# Patient Record
Sex: FEMALE | Race: WHITE | NOT HISPANIC OR LATINO | Employment: FULL TIME | URBAN - METROPOLITAN AREA
[De-identification: names, ages, dates, MRNs, and addresses within clinical notes are randomized per-mention and may not be internally consistent; named-entity substitution may affect disease eponyms.]

---

## 2023-11-13 ENCOUNTER — ANCILLARY PROCEDURE (OUTPATIENT)
Dept: RADIOLOGY | Facility: CLINIC | Age: 58
End: 2023-11-13
Payer: COMMERCIAL

## 2023-11-13 DIAGNOSIS — Z13.820 ENCOUNTER FOR SCREENING FOR OSTEOPOROSIS: ICD-10-CM

## 2023-11-13 PROCEDURE — 77080 DXA BONE DENSITY AXIAL: CPT | Performed by: STUDENT IN AN ORGANIZED HEALTH CARE EDUCATION/TRAINING PROGRAM

## 2023-11-13 PROCEDURE — 77080 DXA BONE DENSITY AXIAL: CPT

## 2023-12-04 ENCOUNTER — ANCILLARY PROCEDURE (OUTPATIENT)
Dept: RADIOLOGY | Facility: CLINIC | Age: 58
End: 2023-12-04
Payer: COMMERCIAL

## 2023-12-04 DIAGNOSIS — Z12.31 ENCOUNTER FOR SCREENING MAMMOGRAM FOR MALIGNANT NEOPLASM OF BREAST: ICD-10-CM

## 2023-12-04 PROCEDURE — 77067 SCR MAMMO BI INCL CAD: CPT | Mod: BILATERAL PROCEDURE | Performed by: RADIOLOGY

## 2023-12-04 PROCEDURE — 77063 BREAST TOMOSYNTHESIS BI: CPT | Mod: BILATERAL PROCEDURE | Performed by: RADIOLOGY

## 2023-12-04 PROCEDURE — 77067 SCR MAMMO BI INCL CAD: CPT

## 2023-12-14 DIAGNOSIS — E55.9 VITAMIN D DEFICIENCY: Primary | ICD-10-CM

## 2023-12-14 RX ORDER — ESTRADIOL 0.1 MG/G
1 CREAM VAGINAL 3 TIMES WEEKLY
COMMUNITY
Start: 2019-04-11 | End: 2024-01-25 | Stop reason: SDUPTHER

## 2023-12-14 RX ORDER — ERGOCALCIFEROL 1.25 MG/1
50000 CAPSULE ORAL
COMMUNITY
End: 2023-12-14 | Stop reason: SDUPTHER

## 2023-12-14 RX ORDER — ERGOCALCIFEROL 1.25 MG/1
50000 CAPSULE ORAL
Qty: 12 CAPSULE | Refills: 3 | Status: SHIPPED | OUTPATIENT
Start: 2023-12-14 | End: 2024-12-13

## 2024-01-21 DIAGNOSIS — Z00.00 HEALTHCARE MAINTENANCE: Primary | ICD-10-CM

## 2024-01-21 DIAGNOSIS — D47.2 MONOCLONAL GAMMOPATHY OF UNDETERMINED SIGNIFICANCE: ICD-10-CM

## 2024-01-21 PROBLEM — U07.1 COVID-19 VIRUS INFECTION: Status: ACTIVE | Noted: 2024-01-21

## 2024-01-21 PROBLEM — B37.9 YEAST INFECTION: Status: ACTIVE | Noted: 2024-01-21

## 2024-01-21 PROBLEM — N63.0 FIBROUS BREAST LUMPS: Status: ACTIVE | Noted: 2024-01-21

## 2024-01-21 PROBLEM — R33.9 URINE RETENTION: Status: ACTIVE | Noted: 2024-01-21

## 2024-01-21 PROBLEM — R79.89 LOW VITAMIN D LEVEL: Status: ACTIVE | Noted: 2024-01-21

## 2024-01-21 PROBLEM — M19.032 PRIMARY OSTEOARTHRITIS OF LEFT WRIST: Status: ACTIVE | Noted: 2024-01-21

## 2024-01-21 PROBLEM — M72.2 PLANTAR FASCIITIS: Status: ACTIVE | Noted: 2018-08-22

## 2024-01-21 PROBLEM — N36.2 URETHRAL POLYP: Status: ACTIVE | Noted: 2024-01-21

## 2024-01-21 PROBLEM — E04.1 NONTOXIC THYROID NODULE: Status: ACTIVE | Noted: 2024-01-21

## 2024-01-21 PROBLEM — K66.0 ABDOMINAL ADHESIONS: Status: ACTIVE | Noted: 2024-01-21

## 2024-01-21 PROBLEM — B00.1 RECURRENT COLD SORES: Status: ACTIVE | Noted: 2024-01-21

## 2024-01-21 PROBLEM — E78.00 ELEVATED LDL CHOLESTEROL LEVEL: Status: ACTIVE | Noted: 2024-01-21

## 2024-01-21 PROBLEM — M19.031 PRIMARY OSTEOARTHRITIS OF RIGHT WRIST: Status: ACTIVE | Noted: 2024-01-21

## 2024-01-21 PROBLEM — M51.36 DEGENERATION OF LUMBAR INTERVERTEBRAL DISC: Status: ACTIVE | Noted: 2024-01-21

## 2024-01-21 PROBLEM — N39.0 RECURRENT UTI: Status: ACTIVE | Noted: 2024-01-21

## 2024-01-21 PROBLEM — E07.89 THYROID FULLNESS: Status: ACTIVE | Noted: 2024-01-21

## 2024-01-21 PROBLEM — M51.369 DEGENERATION OF LUMBAR INTERVERTEBRAL DISC: Status: ACTIVE | Noted: 2024-01-21

## 2024-01-21 PROBLEM — N39.46 URGE AND STRESS INCONTINENCE: Status: ACTIVE | Noted: 2024-01-21

## 2024-01-21 PROBLEM — M19.90 ARTHRITIS: Status: ACTIVE | Noted: 2024-01-21

## 2024-01-21 RX ORDER — FERROUS SULFATE 137(45) MG
1 TABLET, EXTENDED RELEASE ORAL DAILY
COMMUNITY
End: 2024-01-25 | Stop reason: ALTCHOICE

## 2024-01-21 RX ORDER — SOD SULF/POT CHLORIDE/MAG SULF 1.479 G
1 TABLET ORAL DAILY
COMMUNITY
Start: 2023-07-26 | End: 2024-01-25 | Stop reason: ALTCHOICE

## 2024-01-21 RX ORDER — TADALAFIL 5 MG/1
5 TABLET ORAL DAILY
COMMUNITY
Start: 2021-11-30 | End: 2024-01-25 | Stop reason: ENTERED-IN-ERROR

## 2024-01-25 ENCOUNTER — LAB (OUTPATIENT)
Dept: LAB | Facility: LAB | Age: 59
End: 2024-01-25
Payer: COMMERCIAL

## 2024-01-25 ENCOUNTER — OFFICE VISIT (OUTPATIENT)
Dept: PRIMARY CARE | Facility: CLINIC | Age: 59
End: 2024-01-25
Payer: COMMERCIAL

## 2024-01-25 VITALS
OXYGEN SATURATION: 98 % | SYSTOLIC BLOOD PRESSURE: 110 MMHG | HEIGHT: 67 IN | DIASTOLIC BLOOD PRESSURE: 60 MMHG | BODY MASS INDEX: 21.82 KG/M2 | HEART RATE: 61 BPM | TEMPERATURE: 97.4 F | WEIGHT: 139 LBS

## 2024-01-25 DIAGNOSIS — N39.46 URGE AND STRESS INCONTINENCE: ICD-10-CM

## 2024-01-25 DIAGNOSIS — D47.2 MONOCLONAL GAMMOPATHY OF UNDETERMINED SIGNIFICANCE: ICD-10-CM

## 2024-01-25 DIAGNOSIS — B00.1 RECURRENT COLD SORES: ICD-10-CM

## 2024-01-25 DIAGNOSIS — Z00.00 HEALTHCARE MAINTENANCE: ICD-10-CM

## 2024-01-25 DIAGNOSIS — E04.1 NONTOXIC THYROID NODULE: ICD-10-CM

## 2024-01-25 DIAGNOSIS — N39.0 RECURRENT UTI: ICD-10-CM

## 2024-01-25 DIAGNOSIS — Z00.00 HEALTHCARE MAINTENANCE: Primary | ICD-10-CM

## 2024-01-25 DIAGNOSIS — M51.36 DEGENERATION OF LUMBAR INTERVERTEBRAL DISC: ICD-10-CM

## 2024-01-25 DIAGNOSIS — R79.89 LOW VITAMIN D LEVEL: ICD-10-CM

## 2024-01-25 DIAGNOSIS — E78.00 ELEVATED LDL CHOLESTEROL LEVEL: ICD-10-CM

## 2024-01-25 DIAGNOSIS — N63.0 FIBROUS BREAST LUMPS: ICD-10-CM

## 2024-01-25 PROBLEM — U07.1 COVID-19 VIRUS INFECTION: Status: RESOLVED | Noted: 2024-01-21 | Resolved: 2024-01-25

## 2024-01-25 PROBLEM — E07.89 THYROID FULLNESS: Status: RESOLVED | Noted: 2024-01-21 | Resolved: 2024-01-25

## 2024-01-25 PROBLEM — B37.9 YEAST INFECTION: Status: RESOLVED | Noted: 2024-01-21 | Resolved: 2024-01-25

## 2024-01-25 LAB
ALBUMIN SERPL BCP-MCNC: 4.6 G/DL (ref 3.4–5)
ALP SERPL-CCNC: 109 U/L (ref 33–136)
ALT SERPL W P-5'-P-CCNC: 19 U/L (ref 7–45)
ANION GAP SERPL CALC-SCNC: 12 MMOL/L (ref 10–20)
AST SERPL W P-5'-P-CCNC: 21 U/L (ref 9–39)
BASOPHILS # BLD AUTO: 0.04 X10*3/UL (ref 0–0.1)
BASOPHILS NFR BLD AUTO: 0.7 %
BILIRUB SERPL-MCNC: 0.9 MG/DL (ref 0–1.2)
BUN SERPL-MCNC: 15 MG/DL (ref 6–23)
CALCIUM SERPL-MCNC: 9.2 MG/DL (ref 8.6–10.3)
CHLORIDE SERPL-SCNC: 103 MMOL/L (ref 98–107)
CO2 SERPL-SCNC: 28 MMOL/L (ref 21–32)
CREAT SERPL-MCNC: 0.64 MG/DL (ref 0.5–1.05)
CRP SERPL-MCNC: <0.1 MG/DL
EGFRCR SERPLBLD CKD-EPI 2021: >90 ML/MIN/1.73M*2
EOSINOPHIL # BLD AUTO: 0.07 X10*3/UL (ref 0–0.7)
EOSINOPHIL NFR BLD AUTO: 1.3 %
ERYTHROCYTE [DISTWIDTH] IN BLOOD BY AUTOMATED COUNT: 12.8 % (ref 11.5–14.5)
GLUCOSE SERPL-MCNC: 91 MG/DL (ref 74–99)
HCT VFR BLD AUTO: 47 % (ref 36–46)
HGB BLD-MCNC: 15.9 G/DL (ref 12–16)
IMM GRANULOCYTES # BLD AUTO: 0.02 X10*3/UL (ref 0–0.7)
IMM GRANULOCYTES NFR BLD AUTO: 0.4 % (ref 0–0.9)
LDH SERPL L TO P-CCNC: 155 U/L (ref 84–246)
LYMPHOCYTES # BLD AUTO: 1.49 X10*3/UL (ref 1.2–4.8)
LYMPHOCYTES NFR BLD AUTO: 27.4 %
MCH RBC QN AUTO: 30 PG (ref 26–34)
MCHC RBC AUTO-ENTMCNC: 33.8 G/DL (ref 32–36)
MCV RBC AUTO: 89 FL (ref 80–100)
MONOCYTES # BLD AUTO: 0.36 X10*3/UL (ref 0.1–1)
MONOCYTES NFR BLD AUTO: 6.6 %
NEUTROPHILS # BLD AUTO: 3.46 X10*3/UL (ref 1.2–7.7)
NEUTROPHILS NFR BLD AUTO: 63.6 %
NRBC BLD-RTO: 0 /100 WBCS (ref 0–0)
PLATELET # BLD AUTO: 229 X10*3/UL (ref 150–450)
POTASSIUM SERPL-SCNC: 4 MMOL/L (ref 3.5–5.3)
PROT SERPL-MCNC: 7.5 G/DL (ref 6.4–8.2)
PROT SERPL-MCNC: 7.7 G/DL (ref 6.4–8.2)
RBC # BLD AUTO: 5.3 X10*6/UL (ref 4–5.2)
SODIUM SERPL-SCNC: 139 MMOL/L (ref 136–145)
TSH SERPL-ACNC: 1.17 MIU/L (ref 0.44–3.98)
WBC # BLD AUTO: 5.4 X10*3/UL (ref 4.4–11.3)

## 2024-01-25 PROCEDURE — 80053 COMPREHEN METABOLIC PANEL: CPT

## 2024-01-25 PROCEDURE — 36415 COLL VENOUS BLD VENIPUNCTURE: CPT

## 2024-01-25 PROCEDURE — 84155 ASSAY OF PROTEIN SERUM: CPT

## 2024-01-25 PROCEDURE — 83521 IG LIGHT CHAINS FREE EACH: CPT

## 2024-01-25 PROCEDURE — 83615 LACTATE (LD) (LDH) ENZYME: CPT

## 2024-01-25 PROCEDURE — 84165 PROTEIN E-PHORESIS SERUM: CPT | Performed by: INTERNAL MEDICINE

## 2024-01-25 PROCEDURE — 86140 C-REACTIVE PROTEIN: CPT

## 2024-01-25 PROCEDURE — 84443 ASSAY THYROID STIM HORMONE: CPT

## 2024-01-25 PROCEDURE — 99396 PREV VISIT EST AGE 40-64: CPT | Performed by: INTERNAL MEDICINE

## 2024-01-25 PROCEDURE — 84165 PROTEIN E-PHORESIS SERUM: CPT

## 2024-01-25 PROCEDURE — 85025 COMPLETE CBC W/AUTO DIFF WBC: CPT

## 2024-01-25 PROCEDURE — 1036F TOBACCO NON-USER: CPT | Performed by: INTERNAL MEDICINE

## 2024-01-25 RX ORDER — ESTRADIOL 0.1 MG/G
1 CREAM VAGINAL 3 TIMES WEEKLY
Qty: 42.5 G | Refills: 2 | Status: SHIPPED | OUTPATIENT
Start: 2024-01-26

## 2024-01-25 ASSESSMENT — PATIENT HEALTH QUESTIONNAIRE - PHQ9
1. LITTLE INTEREST OR PLEASURE IN DOING THINGS: NOT AT ALL
SUM OF ALL RESPONSES TO PHQ9 QUESTIONS 1 AND 2: 0
2. FEELING DOWN, DEPRESSED OR HOPELESS: NOT AT ALL

## 2024-01-25 NOTE — PROGRESS NOTES
Subjective   Patient ID: Anabella Suarez is a 58 y.o. female who presents for her annual physical     She is compliant with scripted Vitamin D and estrogen vaginal cream  She is taking other supplements as well.    She is bothered by the spider veins in her legs     She is an easy bruiser     HEALTH  PAP  EUGENIA and no longer needs to repeat   Mammo 9/15, , 10/17, , 2021, 2022, 2023   BD 10/13 T-2.1,  T-2.1, 2021 T-2.2, 2023 T-2.3  Colon 2/15, 2023 sessile serrated adenoma and Q 5    -- Her brother  from colon cancer    Ca cardiac score  Zero   EKG , 10/17,   Urine  -,  -   Hep C 3/19 -  FLu shot declines    TDAP , 10/14/2022  Prevnar never   Pneumo never   Shingrix recommended - she does not think she had chicken pox as a child   Pfizer CVD 3/24/2021 and 2021 booster 2022  Ophth - She wears reading glasses. No history of glaucoma or MD.        Review of Systems  All systems negative except those listed in the HPI  Past Medical, Surgical, and Family History reviewed and updated in chart.  Reviewed all medications by prescribing practitioner or clinical pharmacist   (such as prescriptions, OTCs, herbal therapies and supplements) and documented in the medical record      Objective   Visit Vitals  /60 (BP Location: Left arm, Patient Position: Sitting)   Pulse 61   Temp 36.3 °C (97.4 °F) (Temporal)    Body mass index is 22.1 kg/m².     Physical Exam  Vitals reviewed.   Constitutional:       Appearance: Normal appearance. She is normal weight.   HENT:      Head: Normocephalic.      Right Ear: Tympanic membrane, ear canal and external ear normal.      Left Ear: Tympanic membrane, ear canal and external ear normal.      Ears:      Comments: Fluid mild right ear      Nose: Nose normal.      Mouth/Throat:      Pharynx: Oropharynx is clear.   Eyes:      Conjunctiva/sclera: Conjunctivae normal.   Cardiovascular:      Rate and Rhythm: Normal rate and regular  rhythm.      Pulses: Normal pulses.      Heart sounds: Normal heart sounds.      Comments: Bilat LE spider veins/ varicose veins  Pulmonary:      Effort: Pulmonary effort is normal.      Breath sounds: Normal breath sounds.      Comments: No wheezing, moving air well  Abdominal:      General: Bowel sounds are normal.      Palpations: Abdomen is soft.   Musculoskeletal:         General: Normal range of motion.      Cervical back: Normal range of motion and neck supple.   Skin:     General: Skin is warm.   Neurological:      General: No focal deficit present.      Mental Status: She is alert and oriented to person, place, and time.   Psychiatric:         Mood and Affect: Mood normal.         Behavior: Behavior normal.         Thought Content: Thought content normal.         Judgment: Judgment normal.       Assessment/Plan   Problem List Items Addressed This Visit       Degeneration of lumbar intervertebral disc    Elevated LDL cholesterol level    Fibrous breast lumps    Low vitamin D level    Monoclonal gammopathy of undetermined significance    Nontoxic thyroid nodule    Recurrent cold sores    Urge and stress incontinence     Other Visit Diagnoses       Healthcare maintenance    -  Primary            Annual physical completed  Annual labs ordered   Medication reconciliation performed with patient   Reviewed her labs from work:   HDL 92,    Vitamin D 52   HbA1c 5.3%, glucose 83      Health Maintenence completed  -  Discussed healthy diet and regular exercise.    -  Physical exam overall unremarkable. Immunizations reviewed and updated accordingly. Healthy lifestyle choices discussed (tobacco avoidance, appropriate alcohol use, avoidance of illicit substances).   -  Patient is wearing seatbelt.   -  Screening lab work ordered as indicated.    -  Age appropriate screening tests reviewed with patient.       She is  with 1 son. She denies previous history of tobacco use   Son was Dx with testicular cancer  and is in remission   Her son is , lives in [place] and has a baby girl     Her weight/ BMI is in normal range in office, recommend she maintain  Her weight is 139 pounds with BMI at 22.10 IO 1/2024      BP in normal range in office. We will continue to monitor   EKG 10/17 normal, no LVH or strain pattern noted   Recommend she monitor her BP periodically and call with elevated readings      I have spent 15 min face to face with this patient discussing their cardiac risk and behavioral therapies of nutrition choices and exercise. We are trying to eliminate habits that are contributing to their cardiac risk.  We agreed on a plan of how they can reduce their current CV risk   The patient's  10 yr CV risk was estimated at  2 % 1/2024      Elevated lipids: HDL 92,  on labs from work   Explained goal for LDL to be less than 100 and ideally less than 70   Ca cardiac score 11/17 Zero   Recommend she look into a plant based/ whole foods diet      Monitoring for thyroid cancer : Labs ordered and we will adjust if indicated  1/2024    She was in [place] during the nuclear accident and was exposed to radiation   She did not receive Iodine tablets to protect her thyroid   US of thyroid 11/2021 was normal      Monoclonal gammopathy - Labs ordered, we will adjust if indicated 1/2024  She declines to see heme at  anymore.   WBC - Low but baseline for patient   Her platelets are fine and her protein levels are good.      TMJ left dysfunction - Better.   She follows with her dentist routinely      Adhesional blockage left ureter:  She had hydronephrosis and required a stent and nephrostomy placement 7/31/13  She is not taking any medications  She saw Dr Shetty in 10/19     Disc disease back -   She saw Dr. Campbell but is not a surgical candidate yet.  She will stay on Manuka honey which has helped a lot and no inhaler.   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      Vit D Def - Vitamin D 52 on labs from work   Continue scripted Vitamin D 50K UT twice weekly.   Nails brittle and takes Biotin daily      PAP not needed since EUGENIA /BSO. She should still see Gyn every 5 yrs.   Continue estradiol cream 3 times a week. Refilled 2024     Mammo 2023 was normal.   Breast exam normal 2024    Reviewed results of BD from 2023 with patient in detail today 2024  BD 2023 T-2.3. Continue OTC Ca 600 mg BID and scripted Vit D twice weekly and eat 2 servings of calcium enriched foods daily.   Discussed importance of weight bearing exercises       Colonoscopy 2023 sessile serrated adenoma and Q 5    Her brother  from colon cancer      Ophth -   She wears reading glasses. No history of glaucoma or MD.  She will have her next eye exam faxed to my office in order to update her medical records.         Hep C 3/19 -  FLu shot declines    TDAP , 10/14/2022  Prevnar never   Pneumo never   Shingrix recommended - she does not think she had chicken pox as a child   Pfizer CVD 3/24/2021 and 2021 booster 2022    Some elements in the chart were copied from Dr. Penaloza's last office visit with patient.   Notes have been updated where appropriate, and reflect my current medical decision making from today.        Return 1 year for CPE or sooner if needed   (CPE due 2024)     Scribe Attestation  By signing my name below, I, Eileen Evert , Scribe   attest that this documentation has been prepared under the direction and in the presence of Yuliet Penaloza MD.

## 2024-01-26 LAB
KAPPA LC SERPL-MCNC: 1.72 MG/DL (ref 0.33–1.94)
KAPPA LC/LAMBDA SER: 1.89 {RATIO} (ref 0.26–1.65)
LAMBDA LC SERPL-MCNC: 0.91 MG/DL (ref 0.57–2.63)

## 2024-01-29 LAB
ALBUMIN: 4.6 G/DL (ref 3.4–5)
ALPHA 1 GLOBULIN: 0.3 G/DL (ref 0.2–0.6)
ALPHA 2 GLOBULIN: 0.6 G/DL (ref 0.4–1.1)
BETA GLOBULIN: 0.7 G/DL (ref 0.5–1.2)
GAMMA GLOBULIN: 1.3 G/DL (ref 0.5–1.4)
M-PROTEIN 1: 0.4 G/DL
PATH REVIEW-SERUM PROTEIN ELECTROPHORESIS: ABNORMAL
PROTEIN ELECTROPHORESIS COMMENT: ABNORMAL

## 2024-04-29 ENCOUNTER — TELEPHONE (OUTPATIENT)
Dept: PRIMARY CARE | Facility: CLINIC | Age: 59
End: 2024-04-29
Payer: COMMERCIAL

## 2024-04-29 ENCOUNTER — OFFICE VISIT (OUTPATIENT)
Dept: PRIMARY CARE | Facility: CLINIC | Age: 59
End: 2024-04-29
Payer: COMMERCIAL

## 2024-04-29 VITALS
HEIGHT: 67 IN | DIASTOLIC BLOOD PRESSURE: 60 MMHG | SYSTOLIC BLOOD PRESSURE: 118 MMHG | TEMPERATURE: 97.6 F | HEART RATE: 62 BPM | WEIGHT: 137 LBS | BODY MASS INDEX: 21.5 KG/M2 | OXYGEN SATURATION: 98 %

## 2024-04-29 DIAGNOSIS — B49 FUNGAL INFECTION: Primary | ICD-10-CM

## 2024-04-29 DIAGNOSIS — B35.1 FUNGAL NAIL INFECTION: ICD-10-CM

## 2024-04-29 PROCEDURE — 99214 OFFICE O/P EST MOD 30 MIN: CPT | Performed by: INTERNAL MEDICINE

## 2024-04-29 PROCEDURE — 1036F TOBACCO NON-USER: CPT | Performed by: INTERNAL MEDICINE

## 2024-04-29 RX ORDER — KETOCONAZOLE 20 MG/G
CREAM TOPICAL
Qty: 60 G | Refills: 3 | Status: SHIPPED | OUTPATIENT
Start: 2024-04-29

## 2024-04-29 NOTE — PROGRESS NOTES
Subjective   Patient ID: Anabella Suarez is a 58 y.o. female who presents for evaluation for umbilicus irritation and edema       She complains of edema and irritation to the umbilicus area.  She had laparoscopic procedure in the past.   She has no pain but it is mildly itchy.  She has been keeping the area clean and using EtOH on the area  She has used Abx cream on the area also         HEALTH  PAP  EUGENIA and no longer needs to repeat   Mammo 9/15, , 10/17, , , ,    BD 10/13 T-2.1,  T-2.1,  T-2.2,  T-2.3  Colon 2/15,  sessile serrated adenoma and Q 5    -- Her brother  from colon cancer    Ca cardiac score  Zero   EKG , 10/17,   Urine  -,  -   Hep C 3/19 -  FLu shot declines    TDAP , 10/14/2022  Prevnar never   Pneumo never   Shingrix recommended - she does not think she had chicken pox as a child   Pfizer CVD 3/24/2021 and 2021 booster 2022  Ophth - She wears reading glasses. No history of glaucoma or MD.      Review of Systems  All systems negative except those listed in the HPI      Objective   Visit Vitals  /60 (BP Location: Left arm, Patient Position: Sitting)   Pulse 62   Temp 36.4 °C (97.6 °F) (Temporal)    Body mass index is 21.78 kg/m².     Physical Exam  Vitals reviewed.   Constitutional:       Appearance: Normal appearance. She is normal weight.   HENT:      Head: Normocephalic.      Right Ear: Tympanic membrane, ear canal and external ear normal.      Left Ear: Tympanic membrane, ear canal and external ear normal.      Nose: Nose normal.      Mouth/Throat:      Pharynx: Oropharynx is clear.   Eyes:      Conjunctiva/sclera: Conjunctivae normal.   Cardiovascular:      Rate and Rhythm: Normal rate and regular rhythm.      Pulses: Normal pulses.      Heart sounds: Normal heart sounds.   Pulmonary:      Effort: Pulmonary effort is normal.      Breath sounds: Normal breath sounds.   Abdominal:      General: Bowel sounds are  normal.      Palpations: Abdomen is soft.   Musculoskeletal:         General: Normal range of motion.      Cervical back: Normal range of motion and neck supple.      Comments: Bilateral great toenail fungus    Skin:     General: Skin is warm.      Comments: scar tissue from laparoscopy, no open hole noted, no cellulitis to the umbilicus, erythema noted    Neurological:      General: No focal deficit present.      Mental Status: She is alert and oriented to person, place, and time.   Psychiatric:         Mood and Affect: Mood normal.         Behavior: Behavior normal.         Thought Content: Thought content normal.         Judgment: Judgment normal.     Assessment/Plan   Problem List Items Addressed This Visit    None  Visit Diagnoses       Fungal infection    -  Primary    Relevant Medications    ketoconazole (NIZOral) 2 % cream    Fungal nail infection        Relevant Medications    ketoconazole (NIZOral) 2 % cream                Follow up completed  Reviewed her labs from 1/2024     She is  with 1 son. She denies previous history of tobacco use   Son was Dx with testicular cancer and is in remission   Her son is , lives in [place] and has a baby girl     Acute umbilical fungal infection : On exam: scar tissue from laparoscopy, no open hole noted, no cellulitis to the umbilicus, erythema noted 4/24. Explained I think this is from rubbing versus a suture trying to come out.   She complains of edema and irritation to the umbilicus area.  She had laparoscopic procedure in the past.   She has no pain but it is mildly itchy.  She has been keeping the area clean and using EtOH on the area  She has used Abx cream on the area also    Prescription sent in for ketoconazole cream to apply as directed BID. Explained how to clean the area and then apply the cream once fully dried.   Keep umbilicus area clean and dry   She will call if Sx persist or worsen     Bilateral great toe nail fungus:  Prescription sent in  for ketoconazole cream to apply as directed BID. Explained how to clean the area and then apply the cream under the toenail.    Keep trimming the toenail to help remove the fungus. Explained how to trim the nails  Recommend airing the toes out now that it is spring.      Her weight/ BMI is in normal range in office, recommend she maintain  Her weight is 137 pounds with BMI at 21.78 IO 4/2024      BP in normal range in office. We will continue to monitor   EKG 10/17 normal, no LVH or strain pattern noted   Recommend she monitor her BP periodically and call with elevated readings      We discussed the patients cardiovascular risk. If needed, lifestyle modifications recommended including: behavioral therapies of nutrition choices, exercise and eliminate habits that are contributing to their cardiac risk. We agreed to a plan to decrease his cardiovascular risks. Discussed ASA. Reviewed Guidelines and approved recommendations made to patient.   The patient's 10 yr CV risk was estimated at  2% 4/2024      Elevated lipids: HDL 92,  on labs from work   Explained goal for LDL to be less than 100 and ideally less than 70   Ca cardiac score 11/17 Zero   Recommend she look into a plant based/ whole foods diet      Monitoring for thyroid cancer :   She was in [place] during the nuclear accident and was exposed to radiation   She did not receive Iodine tablets to protect her thyroid   US of thyroid 11/2021 was normal      Monoclonal gammopathy - Labs ordered, we will adjust if indicated 1/2024  She declines to see heme at  anymore.   WBC - Low but baseline for patient   Her platelets are fine and her protein levels are good.      TMJ left dysfunction - Better.   She follows with her dentist routinely      Adhesional blockage left ureter:  She had hydronephrosis and required a stent and nephrostomy placement 7/31/13  She is not taking any medications  She saw Dr Shetty in 10/19     Disc disease back -   She saw Dr. Campbell  but is not a surgical candidate yet.  She will stay on Manuka honey which has helped a lot and no inhaler.   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      Vit D Def - Vitamin D 52 on labs from work   Continue scripted Vitamin D 50K UT twice weekly.   Nails brittle and takes Biotin daily      PAP not needed since EUGNEIA /BSO. She should still see Gyn every 5 yrs.   Continue estradiol cream 3 times a week. Refilled 2024     Mammo 2023 was normal.   Breast exam normal 2024     Reviewed results of BD from 2023 with patient in detail today 2024  BD 2023 T-2.3. Continue OTC Ca 600 mg BID and scripted Vit D twice weekly and eat 2 servings of calcium enriched foods daily.   Discussed importance of weight bearing exercises       Colonoscopy 2023 sessile serrated adenoma and Q 5    Her brother  from colon cancer      Ophth -   She wears reading glasses. No history of glaucoma or MD.  She will have her next eye exam faxed to my office in order to update her medical records.         Hep C 3/19 -  FLu shot declines    TDAP , 10/14/2022  Prevnar never   Pneumo never   Shingrix recommended - she does not think she had chicken pox as a child   Pfizer CVD 3/24/2021 and 2021 booster 2022     Some elements in the chart were copied from Dr. Penaloza's last office visit with patient.   Notes have been updated where appropriate, and reflect my current medical decision making from today.        Return 1 year for CPE or sooner if needed   (CPE due 2024)      Scribe Attestation  By signing my name below, IEileen , Sharda    attest that this documentation has been prepared under the direction and in the presence of Yuliet Penaloza MD.

## 2024-04-29 NOTE — TELEPHONE ENCOUNTER
Patient called me 4/28/24 with increasing irritation belly button and wated appointment   Please put her in for 3:45 today to evaluate further

## 2024-05-14 ENCOUNTER — TELEPHONE (OUTPATIENT)
Dept: PRIMARY CARE | Facility: CLINIC | Age: 59
End: 2024-05-14
Payer: COMMERCIAL

## 2024-05-14 NOTE — TELEPHONE ENCOUNTER
Pt reached out to me that she feels belly button is no better but no worse   Recommended derm but wanted to wait until sees me 5/23 since no sooner appointment with her work schedule   Will keep me posted with any changes

## 2024-05-20 ENCOUNTER — APPOINTMENT (OUTPATIENT)
Dept: PRIMARY CARE | Facility: CLINIC | Age: 59
End: 2024-05-20
Payer: COMMERCIAL

## 2024-05-22 NOTE — PROGRESS NOTES
Subjective   Patient ID: Anabella Suarez is a 59 y.o. female who presents for 3 week follow up multiple medical conditions and umbilicus infection that has not improved     She has been using ketoconazole cream on the umbilicus and it has improved some   She is still worried about the umbilical area and wanted to have it checked         HEALTH  PAP  EUGENIA and no longer needs to repeat   Mammo 9/15, , 10/17, , , ,    BD 10/13 T-2.1,  T-2.1,  T-2.2,  T-2.3  Colon 2/15,  serrated adenoma and Q 5    -- Her brother  from colon cancer    Ca cardiac score  Zero   EKG , 10/17,   Urine  -,  -   Hep C 3/19 -  FLu shot declines    TDAP , 10/14/2022  Prevnar never   Pneumo never   Shingrix recommended - she does not think she had chicken pox as a child   Pfizer CVD 3/24/2021 and 2021 booster 2022  Ophth - She wears reading glasses. No history of glaucoma or MD.      Review of Systems  All systems negative except those listed in the HPI       Objective   Visit Vitals  /60 (BP Location: Left arm, Patient Position: Sitting)   Pulse 70   Temp 36 °C (96.8 °F) (Temporal)    Body mass index is 22.1 kg/m².     Physical Exam  Vitals reviewed.   Constitutional:       Appearance: Normal appearance. She is normal weight.   HENT:      Head: Normocephalic.      Right Ear: Tympanic membrane, ear canal and external ear normal.      Left Ear: Tympanic membrane, ear canal and external ear normal.      Nose: Nose normal.      Mouth/Throat:      Pharynx: Oropharynx is clear.   Eyes:      Conjunctiva/sclera: Conjunctivae normal.   Cardiovascular:      Rate and Rhythm: Normal rate and regular rhythm.      Pulses: Normal pulses.      Heart sounds: Normal heart sounds.   Pulmonary:      Effort: Pulmonary effort is normal.      Breath sounds: Normal breath sounds.   Abdominal:      General: Bowel sounds are normal.      Palpations: Abdomen is soft.      Comments: Umbilical  area: scar tissue from laparoscopy, small skin breakdown umbilical area    Musculoskeletal:         General: Normal range of motion.      Cervical back: Normal range of motion and neck supple.   Skin:     General: Skin is warm.   Neurological:      General: No focal deficit present.      Mental Status: She is alert and oriented to person, place, and time.   Psychiatric:         Mood and Affect: Mood normal.         Behavior: Behavior normal.         Thought Content: Thought content normal.         Judgment: Judgment normal.     Assessment/Plan                Follow up completed  Reviewed her labs from 1/24     She is  with 1 son. She denies previous history of tobacco use   Son was Dx with testicular cancer and is in remission   Her son is , lives in [place] and has a baby girl      Acute umbilical fungal infection : On exam: scar tissue from laparoscopy, small skin breakdown umbilical area 5/2024. There is less erythema and the area is healing.   She had laparoscopic procedure in the past.   Stop ketoconazole cream    Prescription sent in for mupirocin cream to apply as directed to the umbilicus area    Keep umbilicus area clean and dry   Recommend she dry the umbilical area very well after showering       Bilateral great toe nail fungus:  Continue ketoconazole cream to apply as directed BID.    Keep trimming the toenail to help remove the fungus. Explained how to trim the nails  Recommend airing the toes out now that it is spring.      Her weight/ BMI is in normal range in office, recommend she maintain  Her weight is 139 pounds with BMI at 22.10 IO 5/2024      BP in normal range in office. We will continue to monitor   EKG 10/17 normal, no LVH or strain pattern noted   Recommend she monitor her BP periodically and call with elevated readings      We spent 15 minutes discussing cardiac risk score.   We discussed the patients cardiovascular risk. If needed, lifestyle modifications recommended  including: behavioral therapies of nutrition choices, exercise and eliminate habits that are contributing to their cardiac risk. We agreed to a plan to decrease his cardiovascular risks. Discussed ASA. Reviewed Guidelines and approved recommendations made to patient.   The patient's 10 yr CV risk was estimated at  2.6 % 5/2024      Elevated lipids: HDL 92,  on labs from work   Explained goal for LDL to be less than 100 and ideally less than 70   Ca cardiac score 11/17 Zero   Recommend she look into a plant based/ whole foods diet      Monitoring for thyroid cancer :   She was in [place] during the nuclear accident and was exposed to radiation   She did not receive Iodine tablets to protect her thyroid   US of thyroid 11/2021 was normal      Monoclonal gammopathy - Jose/ lambda ratio is 1.89 on labs in 1/2024  She declines to see heme at  anymore.   WBC - Low but baseline for patient   Her platelets are fine and her protein levels are good.      TMJ left dysfunction - Better.   She follows with her dentist routinely      Adhesional blockage left ureter:  She had hydronephrosis and required a stent and nephrostomy placement 7/31/13  She is not taking any medications  She saw Dr Shetty in 10/19     Disc disease back -   She saw Dr. Campbell but is not a surgical candidate yet.  She will stay on Manuka honey which has helped a lot and no inhaler.   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      Vit D Def - Vitamin D 52 on labs from work   Continue scripted Vitamin D 50K UT twice weekly.   Nails brittle and takes Biotin daily      PAP  not needed since EUGENIA /BSO. She should still see Gyn every 5 yrs.   Continue estradiol cream 3 times a week. Refilled 2024     Mammo 2023 was normal.   Breast exam normal 2024     Reviewed results of BD from 2023 with patient in detail today 2024  BD 2023 T-2.3. Continue OTC Ca 600 mg BID and scripted Vit D twice weekly and eat 2 servings of calcium enriched foods daily.   Discussed importance of weight bearing exercises       Colonoscopy 2023 sessile serrated adenoma and Q 5    Her brother  from colon cancer      Ophth -   She wears reading glasses. No history of glaucoma or MD.  She will have her next eye exam faxed to my office in order to update her medical records.         Hep C 3/19 -  FLu shot declines    TDAP , 10/14/2022  Prevnar never   Pneumo never   Shingrix recommended - she does not think she had chicken pox as a child   Pfizer CVD 3/24/2021 and 2021 booster 2022      Some elements in the chart were copied from Dr. Penaloza's last office visit with patient.   Notes have been updated where appropriate, and reflect my current medical decision making from today.        Return 1 year for CPE or sooner if needed   (CPE due 2025)      Scribe Attestation  By signing my name below, I Eileen Evert , Scribe   attest that this documentation has been prepared under the direction and in the presence of Yuliet Penaloza MD.

## 2024-05-23 ENCOUNTER — OFFICE VISIT (OUTPATIENT)
Dept: PRIMARY CARE | Facility: CLINIC | Age: 59
End: 2024-05-23
Payer: COMMERCIAL

## 2024-05-23 VITALS
BODY MASS INDEX: 21.82 KG/M2 | SYSTOLIC BLOOD PRESSURE: 108 MMHG | DIASTOLIC BLOOD PRESSURE: 60 MMHG | HEIGHT: 67 IN | TEMPERATURE: 96.8 F | HEART RATE: 70 BPM | WEIGHT: 139 LBS | OXYGEN SATURATION: 98 %

## 2024-05-23 DIAGNOSIS — N39.46 URGE AND STRESS INCONTINENCE: ICD-10-CM

## 2024-05-23 DIAGNOSIS — R21 RASH AND NONSPECIFIC SKIN ERUPTION: Primary | ICD-10-CM

## 2024-05-23 DIAGNOSIS — N36.2 URETHRAL POLYP: ICD-10-CM

## 2024-05-23 DIAGNOSIS — D47.2 MONOCLONAL GAMMOPATHY OF UNDETERMINED SIGNIFICANCE: ICD-10-CM

## 2024-05-23 PROCEDURE — 99214 OFFICE O/P EST MOD 30 MIN: CPT | Performed by: INTERNAL MEDICINE

## 2024-05-23 PROCEDURE — 1036F TOBACCO NON-USER: CPT | Performed by: INTERNAL MEDICINE

## 2024-05-23 RX ORDER — MUPIROCIN 20 MG/G
OINTMENT TOPICAL 3 TIMES DAILY
Qty: 22 G | Refills: 0 | Status: SHIPPED | OUTPATIENT
Start: 2024-05-23 | End: 2024-06-02

## 2024-05-23 ASSESSMENT — PATIENT HEALTH QUESTIONNAIRE - PHQ9
1. LITTLE INTEREST OR PLEASURE IN DOING THINGS: NOT AT ALL
2. FEELING DOWN, DEPRESSED OR HOPELESS: NOT AT ALL
SUM OF ALL RESPONSES TO PHQ9 QUESTIONS 1 AND 2: 0

## 2024-07-28 ENCOUNTER — TELEPHONE (OUTPATIENT)
Dept: PRIMARY CARE | Facility: CLINIC | Age: 59
End: 2024-07-28
Payer: COMMERCIAL

## 2024-07-28 DIAGNOSIS — R30.0 DYSURIA: Primary | ICD-10-CM

## 2024-07-28 RX ORDER — FLUCONAZOLE 150 MG/1
TABLET ORAL
Qty: 2 TABLET | Refills: 1 | Status: SHIPPED | OUTPATIENT
Start: 2024-07-28

## 2024-07-28 RX ORDER — CIPROFLOXACIN 250 MG/1
250 TABLET, FILM COATED ORAL 2 TIMES DAILY
Qty: 14 TABLET | Refills: 0 | Status: SHIPPED | OUTPATIENT
Start: 2024-07-28 | End: 2024-08-04

## 2024-10-28 DIAGNOSIS — N30.00 ACUTE CYSTITIS WITHOUT HEMATURIA: Primary | ICD-10-CM

## 2024-10-28 DIAGNOSIS — B37.31 YEAST VAGINITIS: ICD-10-CM

## 2024-10-28 DIAGNOSIS — R30.0 DYSURIA: ICD-10-CM

## 2024-10-28 RX ORDER — FLUCONAZOLE 150 MG/1
TABLET ORAL
Qty: 2 TABLET | Refills: 1 | Status: SHIPPED | OUTPATIENT
Start: 2024-10-28

## 2024-10-28 RX ORDER — NITROFURANTOIN 25; 75 MG/1; MG/1
100 CAPSULE ORAL 2 TIMES DAILY
Qty: 10 CAPSULE | Refills: 0 | Status: SHIPPED | OUTPATIENT
Start: 2024-10-28 | End: 2024-11-02

## 2024-10-29 ENCOUNTER — TELEPHONE (OUTPATIENT)
Dept: PRIMARY CARE | Facility: CLINIC | Age: 59
End: 2024-10-29
Payer: COMMERCIAL

## 2024-10-29 ENCOUNTER — LAB (OUTPATIENT)
Dept: LAB | Facility: LAB | Age: 59
End: 2024-10-29
Payer: COMMERCIAL

## 2024-10-29 DIAGNOSIS — R30.0 DYSURIA: ICD-10-CM

## 2024-10-29 DIAGNOSIS — R30.0 DYSURIA: Primary | ICD-10-CM

## 2024-10-29 PROCEDURE — 81003 URINALYSIS AUTO W/O SCOPE: CPT

## 2024-10-29 PROCEDURE — 87086 URINE CULTURE/COLONY COUNT: CPT

## 2024-10-30 DIAGNOSIS — R30.0 DYSURIA: Primary | ICD-10-CM

## 2024-10-30 LAB
APPEARANCE UR: CLEAR
BILIRUB UR STRIP.AUTO-MCNC: NEGATIVE MG/DL
COLOR UR: COLORLESS
GLUCOSE UR STRIP.AUTO-MCNC: NORMAL MG/DL
HOLD SPECIMEN: NORMAL
KETONES UR STRIP.AUTO-MCNC: NEGATIVE MG/DL
LEUKOCYTE ESTERASE UR QL STRIP.AUTO: NEGATIVE
NITRITE UR QL STRIP.AUTO: NEGATIVE
PH UR STRIP.AUTO: 7 [PH]
PROT UR STRIP.AUTO-MCNC: NEGATIVE MG/DL
RBC # UR STRIP.AUTO: NEGATIVE /UL
SP GR UR STRIP.AUTO: 1.01
UROBILINOGEN UR STRIP.AUTO-MCNC: NORMAL MG/DL

## 2024-10-31 LAB — BACTERIA UR CULT: NO GROWTH

## 2025-01-25 DIAGNOSIS — D47.2 MONOCLONAL GAMMOPATHY OF UNDETERMINED SIGNIFICANCE: ICD-10-CM

## 2025-01-25 DIAGNOSIS — Z00.00 HEALTHCARE MAINTENANCE: Primary | ICD-10-CM

## 2025-01-25 DIAGNOSIS — R30.0 DYSURIA: ICD-10-CM

## 2025-01-25 DIAGNOSIS — Z12.31 VISIT FOR SCREENING MAMMOGRAM: Primary | ICD-10-CM

## 2025-01-27 ENCOUNTER — APPOINTMENT (OUTPATIENT)
Dept: PRIMARY CARE | Facility: CLINIC | Age: 60
End: 2025-01-27
Payer: COMMERCIAL

## 2025-01-27 VITALS
DIASTOLIC BLOOD PRESSURE: 70 MMHG | BODY MASS INDEX: 21.22 KG/M2 | SYSTOLIC BLOOD PRESSURE: 120 MMHG | OXYGEN SATURATION: 98 % | HEART RATE: 62 BPM | HEIGHT: 68 IN | WEIGHT: 140 LBS

## 2025-01-27 DIAGNOSIS — R71.8 HIGH HEMATOCRIT WITHOUT DEHYDRATION: ICD-10-CM

## 2025-01-27 DIAGNOSIS — N39.46 URGE AND STRESS INCONTINENCE: ICD-10-CM

## 2025-01-27 DIAGNOSIS — Z28.21 FLU VACCINE REFUSED: ICD-10-CM

## 2025-01-27 DIAGNOSIS — E04.1 NONTOXIC THYROID NODULE: ICD-10-CM

## 2025-01-27 DIAGNOSIS — E06.3 HASHIMOTO THYROIDITIS: ICD-10-CM

## 2025-01-27 DIAGNOSIS — Z00.00 HEALTHCARE MAINTENANCE: Primary | ICD-10-CM

## 2025-01-27 DIAGNOSIS — N39.0 RECURRENT UTI: ICD-10-CM

## 2025-01-27 DIAGNOSIS — J34.89 NASAL DRYNESS: ICD-10-CM

## 2025-01-27 DIAGNOSIS — D47.2 MONOCLONAL GAMMOPATHY OF UNDETERMINED SIGNIFICANCE: ICD-10-CM

## 2025-01-27 DIAGNOSIS — R33.9 URINE RETENTION: ICD-10-CM

## 2025-01-27 DIAGNOSIS — B00.1 RECURRENT COLD SORES: ICD-10-CM

## 2025-01-27 DIAGNOSIS — F51.01 PRIMARY INSOMNIA: ICD-10-CM

## 2025-01-27 DIAGNOSIS — M51.360 DEGENERATION OF INTERVERTEBRAL DISC OF LUMBAR REGION WITH DISCOGENIC BACK PAIN: ICD-10-CM

## 2025-01-27 DIAGNOSIS — E78.00 ELEVATED LDL CHOLESTEROL LEVEL: ICD-10-CM

## 2025-01-27 PROCEDURE — 93000 ELECTROCARDIOGRAM COMPLETE: CPT | Performed by: INTERNAL MEDICINE

## 2025-01-27 PROCEDURE — 3008F BODY MASS INDEX DOCD: CPT | Performed by: INTERNAL MEDICINE

## 2025-01-27 PROCEDURE — 99396 PREV VISIT EST AGE 40-64: CPT | Performed by: INTERNAL MEDICINE

## 2025-01-27 PROCEDURE — 1036F TOBACCO NON-USER: CPT | Performed by: INTERNAL MEDICINE

## 2025-01-27 RX ORDER — HYDROXYZINE HYDROCHLORIDE 25 MG/1
25 TABLET, FILM COATED ORAL NIGHTLY
Qty: 90 TABLET | Refills: 0 | Status: SHIPPED | OUTPATIENT
Start: 2025-01-27

## 2025-01-27 RX ORDER — MUPIROCIN 20 MG/G
OINTMENT TOPICAL 3 TIMES DAILY
Qty: 22 G | Refills: 3 | Status: SHIPPED | OUTPATIENT
Start: 2025-01-27 | End: 2025-02-06

## 2025-01-27 NOTE — PROGRESS NOTES
Subjective   Patient ID: Anabella Suarez is a 59 y.o. female presenting for annual physical exam.       HPI    She has evidence of bradycardia on ECG.  She also complaints of difficulty sleeping, she has tried Melatonin without any benefits. She also tried Nyquil which helps her.       HEALTH  PAP  EUGENIA and no longer needs to repeat   Mammo 9/15, , 10/17, , , ,    BD 10/13 T-2.1,  T-2.1,  T-2.2,  T-2.3  Colon 2/15,  serrated adenoma and Q 5    -- Her brother  from colon cancer    Ca cardiac score  Zero   EKG , 10/17,   Urine  -,  -   Hep C 3/19 -  FLu shot declines    TDAP , 10/14/2022  Prevnar never   Pneumo never   Shingrix recommended - she does not think she had chicken pox as a child   Pfizer CVD 3/24/2021 and 2021 booster 2022  Ophth - She wears reading glasses. No history of glaucoma or MD.      Review of Systems  All systems negative except those listed in the HPI     Visit Vitals  /70 (BP Location: Left arm, Patient Position: Sitting, BP Cuff Size: Adult)   Pulse 62       Body mass index is 21.6 kg/m².    Objective   Physical Exam  Constitutional:       Appearance: Normal appearance.   HENT:      Head: Normocephalic.      Right Ear: Tympanic membrane, ear canal and external ear normal.      Left Ear: Tympanic membrane, ear canal and external ear normal.      Nose: Nose normal.   Eyes:      Conjunctiva/sclera: Conjunctivae normal.   Cardiovascular:      Rate and Rhythm: Regular rhythm. Bradycardia present.      Pulses: Normal pulses.      Heart sounds: Normal heart sounds.   Pulmonary:      Effort: Pulmonary effort is normal.   Abdominal:      Palpations: Abdomen is soft.   Musculoskeletal:         General: Tenderness (tenderness to scapular areas) present. Normal range of motion.      Cervical back: Normal range of motion.   Skin:     General: Skin is warm.   Neurological:      General: No focal deficit present.      Mental  Status: She is alert and oriented to person, place, and time.   Psychiatric:         Mood and Affect: Mood normal.         Behavior: Behavior normal.         Thought Content: Thought content normal.         Judgment: Judgment normal.         Assessment/Plan   Diagnoses and all orders for this visit:  Healthcare maintenance  -     ECG 12 lead (Clinic Performed)  Elevated LDL cholesterol level  -     ECG 12 lead (Clinic Performed)  Nontoxic thyroid nodule  Recurrent UTI  Urine retention  Urge and stress incontinence  Monoclonal gammopathy of undetermined significance  Recurrent cold sores  Degeneration of intervertebral disc of lumbar region with discogenic back pain  Flu vaccine refused  Primary insomnia  -     hydrOXYzine HCL (Atarax) 25 mg tablet; Take 1 tablet (25 mg) by mouth once daily at bedtime.  Nasal dryness  -     mupirocin (Bactroban) 2 % ointment; Apply topically 3 times a day for 10 days. apply to affected area  Hashimoto thyroiditis  High hematocrit without dehydration  -     Iron and TIBC; Future    Problem List Items Addressed This Visit       Degeneration of lumbar intervertebral disc    Elevated LDL cholesterol level    Relevant Orders    ECG 12 lead (Clinic Performed)    Monoclonal gammopathy of undetermined significance    RESOLVED: Nontoxic thyroid nodule    Recurrent cold sores    Recurrent UTI    Urge and stress incontinence    Urine retention     Other Visit Diagnoses       Healthcare maintenance    -  Primary    Relevant Orders    ECG 12 lead (Clinic Performed)    Flu vaccine refused        Primary insomnia        Relevant Medications    hydrOXYzine HCL (Atarax) 25 mg tablet    Nasal dryness        Relevant Medications    mupirocin (Bactroban) 2 % ointment    Hashimoto thyroiditis        High hematocrit without dehydration        Relevant Orders    Iron and TIBC          Annual physical completed.  Reviewed her recent labs, patient brought into clinic from 1/25.  Labs ordered.       She is   with 1 son. She denies previous history of tobacco use   Son was Dx with testicular cancer and is in remission   Her son is , lives in [place] and has a baby girl      Bradycardia:  ECG: Sinus bradycardia, Heart rate of 48 bpm. No LVH or strain pattern noted.   Recommend monitoring at home when she is at rest.     Insomnia:   She has tried Melatonin without benefits. She is able to sleep after taking Nyquil.  Recommend OTC sleep aids.  Prescribed Hydroxyzine 25 mg to take before bed time.    Nasal dryness:  Prescribed Bactroban to use PRN.     Bilateral great toe nail fungus:  Continue ketoconazole cream to apply as directed BID.    Keep trimming the toenail to help remove the fungus. Explained how to trim the nails  Recommend airing the toes out now that it is spring.      Her weight/ BMI is in normal range in office, recommend she maintain  Her weight is 140 pounds with BMI at 21.60 IO 1/2025.     BP in normal range in office. We will continue to monitor   EKG 1/2025 sinus bradycardia Sinus bradycardia, 48, no LVH or strain pattern noted   Recommend she monitor her BP periodically and call with elevated readings      We spent 15 minutes discussing cardiac risk score.   We discussed the patients cardiovascular risk. If needed, lifestyle modifications recommended including: behavioral therapies of nutrition choices, exercise and eliminate habits that are contributing to their cardiac risk. We agreed to a plan to decrease his cardiovascular risks. Discussed ASA. Reviewed Guidelines and approved recommendations made to patient.   The patient's 10 yr CV risk was estimated at  2.6 % 5/2024      Elevated lipids: HDL 92, LDL 12 on labs from work   Explained goal for LDL to be less than 100 and ideally less than 70   Ca cardiac score 11/17 Zero   Recommend she look into a plant based/ whole foods diet      Monitoring for thyroid cancer :   She was in [place] during the nuclear accident and was exposed to  radiation   She did not receive Iodine tablets to protect her thyroid   US of thyroid 2021 was normal      Monoclonal gammopathy - Joes/ lambda ratio is 1.89 on labs in 2024  She declines to see heme at  anymore.   WBC - Low but baseline for patient   Her platelets are fine and her protein levels are good.      TMJ left dysfunction - Better.   She follows with her dentist routinely      Adhesional blockage left ureter:  She had hydronephrosis and required a stent and nephrostomy placement 13  She is not taking any medications  She saw Dr Shetty in 10/19     Disc disease back -   She saw Dr. Campbell but is not a surgical candidate yet.  She will stay on Manuka honey which has helped a lot and no inhaler.   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      Vit D Def - Vitamin D 52 on labs from work   Continue scripted Vitamin D 50K UT twice weekly.   Nails brittle and takes Biotin daily      PAP not needed since EUGENIA /BSO. She should still see Gyn every 5 yrs.   Continue estradiol cream 3 times a week. Refilled 2024     Mammo 2023 was normal. Ordered for .  Breast exam normal 2024     Reviewed results of BD from 2023 with patient in detail today 2024  BD 2023 T-2.3. Continue OTC Ca 600 mg BID and scripted Vit D twice weekly and eat 2 servings of calcium enriched foods daily.   Discussed importance of weight bearing exercises       Colonoscopy 2023 sessile serrated adenoma and Q 5    Her brother  from colon cancer      Ophth -   She wears reading glasses. No history of glaucoma or MD.  She will have her next  eye exam faxed to my office in order to update her medical records.         Hep C 3/19 -  FLu shot declines    TDAP 4/14, 10/14/2022  Prevnar never   Pneumo never   Shingrix recommended - she does not think she had chicken pox as a child   Pfizer CVD 3/24/2021 and 4/14/2021 booster 1/4/2022      Some elements in the chart were copied from Dr. Penaloza's last office visit with patient.   Notes have been updated where appropriate, and reflect my current medical decision making from today.        Return 1 year for CPE or sooner if needed   (CPE due 1/2026)     Scribe Attestation  By signing my name below, I, Jayshree Meltondiqui, Scribe attest that this documentation has been prepared under the direction and in the presence of Yuliet Penaloza MD.

## 2025-01-28 PROCEDURE — 83521 IG LIGHT CHAINS FREE EACH: CPT

## 2025-01-28 PROCEDURE — 84165 PROTEIN E-PHORESIS SERUM: CPT

## 2025-01-28 PROCEDURE — 84155 ASSAY OF PROTEIN SERUM: CPT

## 2025-01-29 ENCOUNTER — LAB (OUTPATIENT)
Dept: LAB | Facility: HOSPITAL | Age: 60
End: 2025-01-29
Payer: COMMERCIAL

## 2025-01-29 DIAGNOSIS — D47.2 MONOCLONAL GAMMOPATHY OF UNDETERMINED SIGNIFICANCE: ICD-10-CM

## 2025-01-29 LAB
ALBUMIN: 4.3 G/DL (ref 3.4–5)
ALPHA 1 GLOBULIN: 0.2 G/DL (ref 0.2–0.6)
ALPHA 2 GLOBULIN: 0.6 G/DL (ref 0.4–1.1)
BETA GLOBULIN: 0.7 G/DL (ref 0.5–1.2)
GAMMA GLOBULIN: 1.2 G/DL (ref 0.5–1.4)
IRON SATN MFR SERPL: 13 % (CALC) (ref 16–45)
IRON SERPL-MCNC: 53 MCG/DL (ref 45–160)
KAPPA LC SERPL-MCNC: 1.44 MG/DL (ref 0.33–1.94)
KAPPA LC/LAMBDA SER: 1.5 {RATIO} (ref 0.26–1.65)
LAMBDA LC SERPL-MCNC: 0.96 MG/DL (ref 0.57–2.63)
M-PROTEIN 1: 0.4 G/DL
PATH REVIEW-SERUM PROTEIN ELECTROPHORESIS: ABNORMAL
PROT SERPL-MCNC: 7.1 G/DL (ref 6.4–8.2)
PROTEIN ELECTROPHORESIS COMMENT: ABNORMAL
TIBC SERPL-MCNC: 423 MCG/DL (CALC) (ref 250–450)

## 2025-01-29 NOTE — RESULT ENCOUNTER NOTE
Hi - still waiting on some other labs but the Protein levels are normal now and greatly improved in the Kappa chain

## 2025-01-29 NOTE — RESULT ENCOUNTER NOTE
Mo Kyle-  The iron levels are normal range but lower side and not enough stored so increase the iron in food or take a gentle iron or Slow Fe supplement 3 x week

## 2025-02-05 ENCOUNTER — HOSPITAL ENCOUNTER (OUTPATIENT)
Dept: RADIOLOGY | Facility: CLINIC | Age: 60
Discharge: HOME | End: 2025-02-05
Payer: COMMERCIAL

## 2025-02-05 DIAGNOSIS — Z12.31 VISIT FOR SCREENING MAMMOGRAM: ICD-10-CM

## 2025-02-05 PROCEDURE — 77063 BREAST TOMOSYNTHESIS BI: CPT | Performed by: RADIOLOGY

## 2025-02-05 PROCEDURE — 77067 SCR MAMMO BI INCL CAD: CPT | Performed by: RADIOLOGY

## 2025-02-05 PROCEDURE — 77063 BREAST TOMOSYNTHESIS BI: CPT

## 2025-03-05 ENCOUNTER — TELEPHONE (OUTPATIENT)
Dept: PRIMARY CARE | Facility: CLINIC | Age: 60
End: 2025-03-05
Payer: COMMERCIAL

## 2025-03-05 DIAGNOSIS — J06.9 ACUTE URI: Primary | ICD-10-CM

## 2025-03-05 RX ORDER — AMOXICILLIN AND CLAVULANATE POTASSIUM 875; 125 MG/1; MG/1
875 TABLET, FILM COATED ORAL 2 TIMES DAILY
Qty: 20 TABLET | Refills: 0 | Status: SHIPPED | OUTPATIENT
Start: 2025-03-05 | End: 2025-03-15

## 2025-03-06 ENCOUNTER — OFFICE VISIT (OUTPATIENT)
Dept: PRIMARY CARE | Facility: CLINIC | Age: 60
End: 2025-03-06
Payer: COMMERCIAL

## 2025-03-06 VITALS
WEIGHT: 140 LBS | DIASTOLIC BLOOD PRESSURE: 60 MMHG | HEIGHT: 68 IN | SYSTOLIC BLOOD PRESSURE: 124 MMHG | OXYGEN SATURATION: 96 % | TEMPERATURE: 97.6 F | BODY MASS INDEX: 21.22 KG/M2 | HEART RATE: 78 BPM

## 2025-03-06 DIAGNOSIS — R11.0 NAUSEA: ICD-10-CM

## 2025-03-06 DIAGNOSIS — H92.03 OTALGIA OF BOTH EARS: ICD-10-CM

## 2025-03-06 DIAGNOSIS — B37.9 YEAST INFECTION: ICD-10-CM

## 2025-03-06 DIAGNOSIS — R05.1 ACUTE COUGH: ICD-10-CM

## 2025-03-06 DIAGNOSIS — J02.8 PHARYNGITIS DUE TO OTHER ORGANISM: ICD-10-CM

## 2025-03-06 DIAGNOSIS — J10.1 INFLUENZA A: Primary | ICD-10-CM

## 2025-03-06 DIAGNOSIS — R51.9 ACUTE INTRACTABLE HEADACHE, UNSPECIFIED HEADACHE TYPE: ICD-10-CM

## 2025-03-06 LAB
POC BINAX EXPIRATION: NORMAL
POC BINAX NOW COVID SERIAL NUMBER: NORMAL
POC RAPID INFLUENZA A: POSITIVE
POC RAPID INFLUENZA B: NEGATIVE
POC SARS-COV-2 AG BINAX: NORMAL

## 2025-03-06 PROCEDURE — 87804 INFLUENZA ASSAY W/OPTIC: CPT | Performed by: INTERNAL MEDICINE

## 2025-03-06 PROCEDURE — 1036F TOBACCO NON-USER: CPT | Performed by: INTERNAL MEDICINE

## 2025-03-06 PROCEDURE — 3008F BODY MASS INDEX DOCD: CPT | Performed by: INTERNAL MEDICINE

## 2025-03-06 PROCEDURE — 99214 OFFICE O/P EST MOD 30 MIN: CPT | Performed by: INTERNAL MEDICINE

## 2025-03-06 PROCEDURE — 87811 SARS-COV-2 COVID19 W/OPTIC: CPT | Performed by: INTERNAL MEDICINE

## 2025-03-06 RX ORDER — BENZONATATE 200 MG/1
200 CAPSULE ORAL 3 TIMES DAILY PRN
Qty: 42 CAPSULE | Refills: 0 | Status: SHIPPED | OUTPATIENT
Start: 2025-03-06 | End: 2025-04-05

## 2025-03-06 RX ORDER — FLUCONAZOLE 150 MG/1
TABLET ORAL
Qty: 2 TABLET | Refills: 1 | Status: SHIPPED | OUTPATIENT
Start: 2025-03-06

## 2025-03-06 RX ORDER — ONDANSETRON 4 MG/1
4 TABLET, FILM COATED ORAL EVERY 8 HOURS PRN
Qty: 30 TABLET | Refills: 0 | Status: SHIPPED | OUTPATIENT
Start: 2025-03-06 | End: 2025-03-16

## 2025-03-06 RX ORDER — OSELTAMIVIR PHOSPHATE 75 MG/1
75 CAPSULE ORAL 2 TIMES DAILY
Qty: 10 CAPSULE | Refills: 0 | Status: SHIPPED | OUTPATIENT
Start: 2025-03-06 | End: 2025-03-11

## 2025-03-06 ASSESSMENT — PATIENT HEALTH QUESTIONNAIRE - PHQ9
SUM OF ALL RESPONSES TO PHQ9 QUESTIONS 1 AND 2: 0
2. FEELING DOWN, DEPRESSED OR HOPELESS: NOT AT ALL
1. LITTLE INTEREST OR PLEASURE IN DOING THINGS: NOT AT ALL

## 2025-03-06 NOTE — PROGRESS NOTES
Subjective   Patient ID: Anabella Suarez is a 59 y.o. female who presents for evaluation for UR Sx       Her Sx began 3/5/25. Her  is sick also   She complains of body aching severe HA, cough attacks and generalized weakness  She has photosensitivity with the HA. She has otalgia.         HEALTH  PAP  EUGENIA and no longer needs to repeat   Mammo 9/15, , 10/17, , , ,    BD 10/13 T-2.1,  T-2.1,  T-2.2,  T-2.3  Colon 2/15,  serrated adenoma and Q 5    -- Her brother  from colon cancer    Ca cardiac score  Zero   EKG , 10/17,   Urine  -,  -   Hep C 3/19 -  FLu shot declines    TDAP , 10/14/2022  Prevnar never   Pneumo never   Shingrix recommended - she does not think she had chicken pox as a child   Pfizer CVD 3/24/2021 and 2021 booster 2022  Ophth - She wears reading glasses. No history of glaucoma or MD.      Review of Systems  All systems negative except those listed in the HPI      Objective   Visit Vitals  /60 (BP Location: Left arm, Patient Position: Sitting, BP Cuff Size: Adult)   Pulse 78   Temp 36.4 °C (97.6 °F) (Temporal)    Body mass index is 21.6 kg/m².     Physical Exam  Vitals reviewed.   Constitutional:       Appearance: She is normal weight.   HENT:      Head: Normocephalic.      Right Ear: Tympanic membrane, ear canal and external ear normal.      Left Ear: Tympanic membrane, ear canal and external ear normal.      Nose: Nose normal.      Mouth/Throat:      Pharynx: Oropharynx is clear.   Eyes:      Conjunctiva/sclera: Conjunctivae normal.   Cardiovascular:      Rate and Rhythm: Normal rate.      Pulses: Normal pulses.      Heart sounds: Normal heart sounds.      Comments: hyperdynamic heart sounds  Pulmonary:      Effort: Pulmonary effort is normal.      Breath sounds: Normal breath sounds.      Comments: Rhonchi at the bases   Skin:     General: Skin is warm.   Neurological:      General: No focal deficit present.       Mental Status: She is alert and oriented to person, place, and time.   Psychiatric:         Mood and Affect: Mood normal.         Behavior: Behavior normal.         Thought Content: Thought content normal.         Judgment: Judgment normal.       Assessment/Plan   Problem List Items Addressed This Visit    None  Visit Diagnoses       Influenza A    -  Primary    Relevant Medications    oseltamivir (Tamiflu) 75 mg capsule    ondansetron (Zofran) 4 mg tablet    Acute cough        Relevant Orders    POCT Influenza A/B manually resulted (Completed)    POCT BinaxNOW Covid-19 Ag Card manually resulted (Completed)    Otalgia of both ears        Pharyngitis due to other organism        Acute intractable headache, unspecified headache type        Nausea        Relevant Medications    ondansetron (Zofran) 4 mg tablet    Yeast infection                Follow up completed    She is  with 1 son. She denies previous history of tobacco use   Son was Dx with testicular cancer and is in remission   Her son is , lives in [place] and has a baby girl     Influenza A positive IO 3/6/25: On exam: rhonchi at the bases, hyperdynamic heart sounds 3/25.  is in with the patient and has mild Sx as well. Tested for Influenza A and showed a mild reaction. I called in Tamiflu for her  3/25  Her Sx began 3/5/25. Her  is sick also     She complains of body aching severe HA, cough attacks and generalized weakness  She has photosensitivity with the HA. She has otalgia   IO Influenza A/B and COVID testing 3/25- She tested positive for Influenza A  Called in Augmentin 875 mg BID for 14 days yesterday 3/5/24.   She will complete the full course of Augmentin  Prescription sent in for Diflucan 150 mg to take as directed for yeast prophylaxis   Prescription sent in for Tessalon Perles 200 mg up to TID/ prn cough   Prescription sent in for Tamiflu to take as directed, possible side effects discussed   Prescription sent in  for Zofran 4 mg to use prn nausea with Tamiflu   Explained her Sx will last approx 10 days   Encouraged rest (14- 16 hours a day) and increased hydration with warm fluids   Recommend humidifier use at night during winter months (Oct- Mar)   She declines the influenza vaccine. Recommend she reconsider getting vaccinated   She has to stay well hydrated and has to eat as much as possible. Discussed drinking broths (chicken, beef or vegetable) and eat small meals more frequently. Warned patient of dehydration.   She will call if Sx persist or worsen       Her weight/ BMI is in normal range in office, recommend she maintain  Her weight is 140 pounds with BMI at 21.60 IO 3/25.     Bradycardia:  EKG 1/25 Sinus bradycardia, 48, no LVH or strain pattern noted   Recommend she monitor her BP periodically and call with elevated readings       We spent 15 minutes discussing cardiac risk score.   We discussed the patients cardiovascular risk. If needed, lifestyle modifications recommended including: behavioral therapies of nutrition choices, exercise and eliminate habits that are contributing to their cardiac risk. We agreed to a plan to decrease his cardiovascular risks. Discussed ASA. Reviewed Guidelines and approved recommendations made to patient.   The patient's 10 yr CV risk was estimated at : ACO score 2/6 IO 3/25      Elevated lipids: HDL 92, LDL 12 on labs from work   Explained goal for LDL to be less than 100 and ideally less than 70   Ca cardiac score 11/17 Zero      Monitoring for thyroid cancer :   She was in [place] during the nuclear accident and was exposed to radiation   She did not receive Iodine tablets to protect her thyroid   US of thyroid 11/2021 was normal      Monoclonal gammopathy - Jose/ lambda ratio is 1.89 on labs in 1/2024  She declines to see heme at [facility] anymore.   WBC - Low but baseline for patient   Her platelets are fine and her protein levels are good.      TMJ left dysfunction - Better.   She  follows with her dentist routinely     Bilat LE spider veins/ varicose veins: She is asymptomatic   Recommend seeing plastics when she is ready     Insomnia:   She has tried Melatonin without benefits.   She is able to sleep after taking Nyquil.  Continue hydroxyzine 25 mg to take before bed time.     Adhesional blockage left ureter:  She had hydronephrosis and required a stent and nephrostomy placement 13  She is not taking any medications  She saw Dr Shetty in 10/19     Disc disease back -   She saw Dr. Campbell but is not a surgical candidate yet.  She will stay on Manuka honey which has helped a lot and no inhaler.   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    Keep trimming the toenail to help remove the fungus. Explained how to trim the nails  Recommend airing the toes out now that it is spring.       Cold sores -   She applies acyclovir cream PRN.      Vit D Def - Vitamin D 52 on labs from work   Discontinue scripted Vitamin D due to concerns for bone loss with higher doses  Recommend taking OTC Vitamin D3 5000 UT daily      PAP not needed since EUGENIA /BSO. She should still sees Gyn every 5 yrs.   Continue estradiol cream 3 times a week.       Mammo  was normal.    Breast exam normal 2024     BD 2023 T-2.3. Continue OTC Ca 600 mg BID and OTC Vitamin D3 5000 UT daily  and eat 2 servings of calcium enriched foods daily.   Discussed importance of weight bearing exercises       Colonoscopy 2023 sessile serrated adenoma and Q 5    Her brother  from colon cancer      Ophth -   She wears reading glasses. No history of glaucoma or MD.  She will have her next eye exam faxed to my office in  order to update her medical records.         Hep C 3/19 -  FLu shot declines    TDAP 4/14, 10/14/2022  Prevnar never   Pneumo never   Shingrix recommended - she does not think she had chicken pox as a child   Pfizer CVD 3/24/2021 and 4/14/2021 booster 1/4/2022      Some elements in the chart were copied from Dr. Penaloza's last office visit with patient.   Notes have been updated where appropriate, and reflect my current medical decision making from today.        Return 1 year for CPE or sooner if needed   (CPE due 1/2026)      Scribe Attestation  By signing my name below, I, Eileen Pelletierrd , Scribe   attest that this documentation has been prepared under the direction and in the presence of Yuliet Penaloza MD.

## 2025-03-06 NOTE — TELEPHONE ENCOUNTER
Paged this evening by patient   Feels sick with hedache and severe ST and cough and no fever   Not able to check for covid or flu   Told her that if flu than antibiotics will not help but will treat with Augmentin and see if better

## 2025-04-24 ENCOUNTER — TELEPHONE (OUTPATIENT)
Dept: PRIMARY CARE | Facility: CLINIC | Age: 60
End: 2025-04-24
Payer: COMMERCIAL

## 2025-04-24 DIAGNOSIS — B37.9 YEAST INFECTION: ICD-10-CM

## 2025-04-24 DIAGNOSIS — N39.0 RECURRENT UTI: Primary | ICD-10-CM

## 2025-04-24 RX ORDER — SULFAMETHOXAZOLE AND TRIMETHOPRIM 800; 160 MG/1; MG/1
1 TABLET ORAL 2 TIMES DAILY
Qty: 14 TABLET | Refills: 0 | Status: SHIPPED | OUTPATIENT
Start: 2025-04-24 | End: 2025-05-01

## 2025-04-24 RX ORDER — FLUCONAZOLE 150 MG/1
TABLET ORAL
Qty: 2 TABLET | Refills: 1 | Status: SHIPPED | OUTPATIENT
Start: 2025-04-24

## 2025-04-27 NOTE — TELEPHONE ENCOUNTER
Vesna that had UTI symptom again and burning and so called in bactrim and diflucan since worked well for her last time

## 2025-05-01 DIAGNOSIS — R30.0 DYSURIA: Primary | ICD-10-CM

## 2025-05-04 LAB
APPEARANCE UR: CLEAR
BACTERIA #/AREA URNS HPF: ABNORMAL /HPF
BACTERIA UR CULT: NORMAL
BILIRUB UR QL STRIP: NEGATIVE
COLOR UR: YELLOW
GLUCOSE UR QL STRIP: NEGATIVE
HGB UR QL STRIP: NEGATIVE
HYALINE CASTS #/AREA URNS LPF: ABNORMAL /LPF
KETONES UR QL STRIP: NEGATIVE
LEUKOCYTE ESTERASE UR QL STRIP: ABNORMAL
NITRITE UR QL STRIP: NEGATIVE
PH UR STRIP: 6 [PH] (ref 5–8)
PROT UR QL STRIP: NEGATIVE
RBC #/AREA URNS HPF: ABNORMAL /HPF
SERVICE CMNT-IMP: ABNORMAL
SP GR UR STRIP: 1.01 (ref 1–1.03)
SQUAMOUS #/AREA URNS HPF: ABNORMAL /HPF
WBC #/AREA URNS HPF: ABNORMAL /HPF

## 2025-05-05 DIAGNOSIS — R30.0 DYSURIA: Primary | ICD-10-CM

## 2025-05-05 DIAGNOSIS — R10.2 PELVIC PRESSURE IN FEMALE: ICD-10-CM

## 2025-05-13 ENCOUNTER — APPOINTMENT (OUTPATIENT)
Dept: UROLOGY | Facility: CLINIC | Age: 60
End: 2025-05-13
Payer: COMMERCIAL

## 2025-05-15 ENCOUNTER — OFFICE VISIT (OUTPATIENT)
Dept: UROLOGY | Facility: CLINIC | Age: 60
End: 2025-05-15
Payer: COMMERCIAL

## 2025-05-15 VITALS
HEIGHT: 67 IN | TEMPERATURE: 97.9 F | HEART RATE: 60 BPM | BODY MASS INDEX: 21.66 KG/M2 | WEIGHT: 138 LBS | SYSTOLIC BLOOD PRESSURE: 135 MMHG | DIASTOLIC BLOOD PRESSURE: 75 MMHG

## 2025-05-15 DIAGNOSIS — R39.9 UTI SYMPTOMS: ICD-10-CM

## 2025-05-15 DIAGNOSIS — N39.0 RECURRENT URINARY TRACT INFECTION: Primary | ICD-10-CM

## 2025-05-15 DIAGNOSIS — N95.8 GENITOURINARY SYNDROME OF MENOPAUSE: ICD-10-CM

## 2025-05-15 DIAGNOSIS — R30.0 DYSURIA: ICD-10-CM

## 2025-05-15 LAB
POC APPEARANCE, URINE: CLEAR
POC BILIRUBIN, URINE: NEGATIVE
POC BLOOD, URINE: NEGATIVE
POC COLOR, URINE: YELLOW
POC GLUCOSE, URINE: NEGATIVE MG/DL
POC KETONES, URINE: NEGATIVE MG/DL
POC LEUKOCYTES, URINE: ABNORMAL
POC NITRITE,URINE: NEGATIVE
POC PH, URINE: 6 PH
POC PROTEIN, URINE: NEGATIVE MG/DL
POC SPECIFIC GRAVITY, URINE: 1.02
POC UROBILINOGEN, URINE: 0.2 EU/DL

## 2025-05-15 PROCEDURE — 51798 US URINE CAPACITY MEASURE: CPT | Performed by: NURSE PRACTITIONER

## 2025-05-15 PROCEDURE — 1036F TOBACCO NON-USER: CPT | Performed by: NURSE PRACTITIONER

## 2025-05-15 PROCEDURE — 3008F BODY MASS INDEX DOCD: CPT | Performed by: NURSE PRACTITIONER

## 2025-05-15 PROCEDURE — 99204 OFFICE O/P NEW MOD 45 MIN: CPT | Performed by: NURSE PRACTITIONER

## 2025-05-15 PROCEDURE — 81003 URINALYSIS AUTO W/O SCOPE: CPT | Performed by: NURSE PRACTITIONER

## 2025-05-15 RX ORDER — ESTRADIOL 0.1 MG/G
CREAM VAGINAL
Qty: 42.5 G | Refills: 5 | Status: SHIPPED | OUTPATIENT
Start: 2025-05-15 | End: 2026-05-15

## 2025-05-15 NOTE — PATIENT INSTRUCTIONS
Renal ultarsound -schedule today before leave  Start vaginal estrogen and dmanose  Start vulvar moisturizer,sample or can use aquarphor, vaseline, coconut oil  Follow up w Lisa 6 weeks  Nurse line [phone]    Discussed prevention of urinary tract infections including  1. Voiding 8 x per day, urinating every 2-3 hours to flush out bacteria  2. Aggressive management of both constipation or diarrhea   3. Vitamin C 500 mg daily to help boost immune system  4. D-Mannose 2 gram daily either powder or pill or cranberry 500 mg or 4 oz juice three times per day as directed   5. Vaginal acidification as directed-estrogen vaginal cream 1 gram amount estrogen cream on finger, rub into tissue nightly x 2 weeks, then M, W, F at bedtime

## 2025-05-15 NOTE — PROGRESS NOTES
05/15/25   [id number]    Chief Complaint   Patient presents with    UTI    Recurrent Urinary tract infections, lower urinary tract symptoms  Subjective      HPI Anabella Suarez is a 60 y.o. female who presents for recurrent urinary tract infections, lower urinary tract symptoms. Kindly referred by Dr. Penaloza. History significant for hydronephrosis related to large left ovarian cystic mass (benign) removed 2013; later had to more procedures related to hydronephrosis with stent after mass was removed in 2013 and 2014. Saw Dr. Shetty in past, last in 2019, history of urethral polyp, at that time UTIs every few months then and still occurring; on exam Dr. Shetty noted two urethral caruncle on exam; when patient has sx of UTI, calls primary care, usually take bactrim to treat the UTIs; symptoms is dysuria, usually yeast from antibiotics per patient; not using estrogen cream regularly;     5/2/25 UA w micro wbc 0-5/hpf, rbc none seen, no bacteria, culture normal genital thai    10/29/24 UA negative, urine culture no growth  10/14/25 creatinine 0.65,   2/22/22 Ecoli + urine culture    PVR 0 ml, UA trace leuk    11/22/16 Renal ultrasound: stable aml 6 mm right,  no hydro, no stone either kidney;      4/7/14 RIGOBERTO A prominent right renal pelvis but without evidence of caliectasis. This represents improvement when compared to the CT of 1/3/2014.    1/3/2014 CT abd/pel w IV Right lateral pelvic complex cystic mass producing obstruction of the right ureter with moderate to severe right-sided hydronephrosis. Right-sided hydronephrosis has progressed or is new from previous renal ultrasound of 10/8/2013. Findings are concerning for cystic lymphadenopathy, ovarian cystic neoplasm or inflammatory cystic mass. Associated small amount of free fluid.  2013 mild right caliectasis    DTF 5 x, NTF 1 x some UUI, often CIERA  Not sexually active, ok at this time with partner, dyspareunia    PMH arthritis  PSH hysterectomy 2011, large left  "ovarian cystic mass 2013, hydronephrosis 2013 and 2014;  FH mother ovarian cancer  SH Ceci, non smoker        Objective     /75   Pulse 60   Temp 36.6 °C (97.9 °F)   Ht 1.702 m (5' 7\")   Wt 62.6 kg (138 lb)   BMI 21.61 kg/m²    Physical Exam  Genitourinary:     Comments: No vulvar lesions, neg Qtip tenderness, mod atrophy  Neg CST lying down, Neg levator ani tenderness or tightness  Stage I-II cystocele, no rectocele, hysterectomy  Non tender ovaries/uterus (hysterectomy), difficult exam d/t  body habitus   No rectal exam done        General: Appears comfortable and in no apparent distress, well nourished  Head: Normocephalic, atraumatic  Neck: trachea midline  Respiratory: respirations unlabored, no wheezes, and no use of accessory muscles  Cardiovascular: at rest no dyspnea, well perfused  Skin: no visible rashes or lesions  Neurologic: grossly intact, oriented to person, place, and time  Psychiatric: mood and affect appropriate  Musculoskeletal: in chair for appt. no difficulty w upper body movement    Assessment/Plan   Problem List Items Addressed This Visit    None  Visit Diagnoses         Recurrent urinary tract infection    -  Primary    Relevant Orders    POCT UA Automated manually resulted (Completed)    Post-Void Residual (Completed)      UTI symptoms        Relevant Orders    POCT UA Automated manually resulted (Completed)    Post-Void Residual (Completed)          Orders Placed This Encounter   Procedures    Post-Void Residual    POCT UA Automated manually resulted     [facility]   Im[facility] [1]      Renal ultarsound -schedule today before leave  Start vaginal estrogen and dmanose  Start vulvar moisturizer,sample or can use aquarphor, vaseline, coconut oil  Follow up w Lisa 6 weeks  Nurse line [phone]    Discussed prevention of urinary tract infections including  1. Voiding 8 x per day, urinating every 2-3 hours to flush out bacteria  2. Aggressive management of both " constipation or diarrhea   3. Vitamin C 500 mg daily to help boost immune system  4. D-Mannose 2 gram daily either powder or pill or cranberry 500 mg or 4 oz juice three times per day as directed   5. Vaginal acidification as directed-estrogen vaginal cream 1 gram amount estrogen cream on finger, rub into tissue nightly x 2 weeks, then M, W, F at bedtime       1. Please schedule renal ultrasound  2. Follow up Lisa 6  weeks, imaging  AWA Meyer-CNP  Lab Results   Component Value Date    GLUCOSE 91 01/25/2024    CALCIUM 9.2 01/25/2024     01/25/2024    K 4.0 01/25/2024    CO2 28 01/25/2024     01/25/2024    BUN 15 01/25/2024    CREATININE 0.64 01/25/2024

## 2025-05-21 ENCOUNTER — HOSPITAL ENCOUNTER (OUTPATIENT)
Dept: RADIOLOGY | Facility: CLINIC | Age: 60
Discharge: HOME | End: 2025-05-21
Payer: COMMERCIAL

## 2025-05-21 DIAGNOSIS — N39.0 RECURRENT URINARY TRACT INFECTION: ICD-10-CM

## 2025-05-21 PROCEDURE — 76770 US EXAM ABDO BACK WALL COMP: CPT

## 2025-05-22 ENCOUNTER — TELEPHONE (OUTPATIENT)
Dept: UROLOGY | Facility: CLINIC | Age: 60
End: 2025-05-22
Payer: COMMERCIAL

## 2025-05-22 NOTE — TELEPHONE ENCOUNTER
Pt left message stating she has her US done yesterday and they told her you would get the results today and she wants to know if you have them yet because she does not feel well and is very worried. Please advise, thanks.

## 2025-06-06 DIAGNOSIS — R39.9 UTI SYMPTOMS: ICD-10-CM

## 2025-06-06 DIAGNOSIS — R30.0 DYSURIA: ICD-10-CM

## 2025-06-17 NOTE — PROGRESS NOTES
Subjective   Patient ID: Anabella Suarez is a 60 y.o. female who presents for evaluation for GI Sx       She complains of abdominal bloating, weight loss and abdominal discomfort   She has a good BM daily but something has changed. She has fecal leakage intermittently   She has issues evacuating the colon fully with BM   She denies hematochezia. She has itching around the anus for the past month   She is taking supplements and started D mannose. She has stopped everything now   She has increased gas all the time. She is not experiencing heartburn   She denies emesis. She has increased stressors. She has 5 pound unintentional weight loss   She would like to be tested for parasites.            HEALTH  PAP  EUGENIA and no longer needs to repeat   Mammo , , , ,    BD 10/13 T-2.1,  T-2.1,  T-2.2,  T-2.3  Colon 2/15,  serrated adenoma and Q 5    -- Her brother  from colon cancer    Ca cardiac score  Zero   EKG , 10/17,   Urine  -,  -   Hep C 3/19 -  FLu shot declines    TDAP , 10/14/22  Prevnar never   Pneumo never   Shingrix recommended - she does not think she had chicken pox as a child   SARS-CoV-2- 3/21, ,   Ophth - She wears reading glasses. No history of glaucoma or MD.       Review of Systems  All systems negative except those listed in the HPI      Objective   Visit Vitals  /70 (BP Location: Left arm, Patient Position: Sitting, BP Cuff Size: Adult)   Pulse 51   Temp 35.8 °C (96.4 °F)    Body mass index is 21.14 kg/m².     Physical Exam  Vitals reviewed.   Constitutional:       Appearance: Normal appearance. She is normal weight.   HENT:      Head: Normocephalic.      Right Ear: Tympanic membrane, ear canal and external ear normal.      Left Ear: Tympanic membrane, ear canal and external ear normal.      Nose: Nose normal.      Mouth/Throat:      Pharynx: Oropharynx is clear.   Eyes:      Conjunctiva/sclera: Conjunctivae normal.    Cardiovascular:      Rate and Rhythm: Normal rate and regular rhythm.      Pulses: Normal pulses.      Heart sounds: Normal heart sounds.   Pulmonary:      Effort: Pulmonary effort is normal.      Breath sounds: Normal breath sounds.   Abdominal:      General: Bowel sounds are normal.      Palpations: Abdomen is soft.      Comments: tenderness left abdomen, she has adhesions from previous surgeries   Musculoskeletal:         General: Normal range of motion.      Cervical back: Normal range of motion and neck supple.   Skin:     General: Skin is warm.   Neurological:      General: No focal deficit present.      Mental Status: She is alert and oriented to person, place, and time.   Psychiatric:         Mood and Affect: Mood normal.         Behavior: Behavior normal.         Thought Content: Thought content normal.         Judgment: Judgment normal.       Assessment/Plan   Problem List Items Addressed This Visit       Abdominal adhesions    Recurrent UTI     Other Visit Diagnoses         Abdominal bloating    -  Primary      Weight loss          Renal cyst, left          Diarrhea, unspecified type                Follow up completed  Labs ordered     She is  with 1 son. She denies previous history of tobacco use   Son was Dx with testicular cancer and is in remission   Her son is , lives in [place] and has a baby girl     Abd bloating and discomfort with weight loss: On exam: tenderness left abdomen, she has adhesions from previous surgeries 6/25. Ddx discussed with patient 6/25. I think we should treat her for diverticulitis and if NI we will order a CT scan for further evaluation.   She complains of abdominal bloating, weight loss and abdominal discomfort   She has a good BM daily but something has changed. She has fecal leakage intermittently   She has issues evacuating the colon fully with BM   She denies hematochezia. She has itching around the anus for the past month   She is taking supplements and  started D mannose. She has stopped everything now   She has increased gas all the time. She is not experiencing heartburn   She denies emesis. She has increased stressors. She has 5 pound unintentional weight loss   She eats nuts every day.   She would like to be tested for parasites.     Stool Labs ordered for further evaluation 6/25- she is to do the stool samples before beginning Abx   Prescription sent in for Flagyl 500 mg BID and Cipro 500 mg BID for 7 days, possible side effects discussed with medications.   Prescription sent in for Diflucan 150 mg to take for yeast prophylaxis    She is to avoid all EtOH and nuts. Recommend she lower her stress levels.   Continue with fiber intake daily.  If NI with treatment we will order a CT scan for further evaluation 6/25    Daniel: Reviewed results of renal US from 5/25 and CT abd from 2014 with patient 6/25  Renal US 5/25 Simple left renal cyst. Otherwise unremarkable exam.    She saw DELIO Dos Santos in 5/25 and begin estrogen vaginal cream and dmanose       Her weight/ BMI is in normal range in office, recommend she maintain  Her weight is 135 pounds with BMI at 21.14 IO 6/25.  She has 5 pound unintentional weight loss      Bradycardia:  EKG 1/25 Sinus bradycardia, 48, no LVH or strain pattern noted   Recommend she monitor her BP periodically and call with elevated readings       I have spent 15 min face to face with this patient discussing their cardiac risk   We discussed the patients cardiovascular risk. If needed, lifestyle modifications recommended including: behavioral therapies of nutrition choices, exercise and eliminate habits that are contributing to their cardiac risk. We agreed to a plan to decrease his cardiovascular risks. Discussed ASA. Reviewed Guidelines and approved recommendations made to patient.   The patient's 10 yr CV risk was estimated at : ACO score 2/6 IO 6/25     Elevated lipids: HDL 92, LDL 12 on labs from work   Explained goal for LDL to be less  than 100 and ideally less than 70   Ca cardiac score 11/17 Zero      Monitoring for thyroid cancer :   She was in [place] during the nuclear accident and was exposed to radiation   She did not receive Iodine tablets to protect her thyroid   US of thyroid 11/2021 was normal      Monoclonal gammopathy - Jose/ lambda ratio is 1.89 on labs in 1/2024  She declines to see heme at  anymore.   WBC - Low but baseline for patient   Her platelets are fine and her protein levels are good.      TMJ left dysfunction - Better.   She follows with her dentist routinely      Bilat LE spider veins/ varicose veins: She is asymptomatic   Recommend seeing plastics when she is ready      Insomnia:   She has tried Melatonin without benefits.   She is able to sleep after taking Nyquil.  Continue hydroxyzine 25 mg to take before bed time.    Adhesional blockage left ureter:  She had hydronephrosis and required a stent and nephrostomy placement 7/31/13  She is not taking any medications  She saw Dr Shetty in 10/19     Disc disease back -   She saw Dr. Campbell but is not a surgical candidate yet.  She will stay on Manuka honey which has helped a lot and no inhaler.   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    Keep trimming the toenail to help remove the fungus. Explained how to trim the nails  Recommend airing the toes out now that it is spring.       Cold sores -   She applies acyclovir cream PRN.      Vit D Def - Vitamin D 52 on labs from work   Discontinue scripted Vitamin D due to concerns for bone loss with higher doses  Recommend taking OTC Vitamin D3 5000 UT daily      PAP not needed since EUGENIA /BSO. She should still sees  Gyn every 5 yrs.   Continue estradiol cream 3 times a week.       Mammo  was normal.    Breast exam normal 2024     BD 2023 T-2.3. Continue OTC Ca 600 mg BID and OTC Vitamin D3 5000 UT daily  and eat 2 servings of calcium enriched foods daily.   Discussed importance of weight bearing exercises       Colonoscopy 2023 sessile serrated adenoma and Q 5    Her brother  from colon cancer      Ophth -   She wears reading glasses. No history of glaucoma or MD.  She will have her next eye exam faxed to my office in order to update her medical records.         Hep C 3/19 -  FLu shot declines    TDAP , 10/14/22  Prevnar never   Pneumo never   Shingrix recommended - she does not think she had chicken pox as a child   SARS-CoV-2- 3/21, ,       Some elements in the chart were copied from Dr. Penaloza's last office visit with patient.   Notes have been updated where appropriate, and reflect my current medical decision making from today.        Return 1 year for CPE or sooner if needed   (CPE due 2026)      Scribe Attestation  By signing my name below, I, Eileen Pelletierrd , Scribe attest that this documentation has been prepared under the direction and in the presence of Yuliet Penaloza MD.

## 2025-06-18 ENCOUNTER — OFFICE VISIT (OUTPATIENT)
Dept: PRIMARY CARE | Facility: CLINIC | Age: 60
End: 2025-06-18
Payer: COMMERCIAL

## 2025-06-18 VITALS
OXYGEN SATURATION: 98 % | DIASTOLIC BLOOD PRESSURE: 70 MMHG | BODY MASS INDEX: 21.19 KG/M2 | HEART RATE: 51 BPM | HEIGHT: 67 IN | TEMPERATURE: 96.4 F | WEIGHT: 135 LBS | SYSTOLIC BLOOD PRESSURE: 130 MMHG

## 2025-06-18 DIAGNOSIS — N39.0 RECURRENT UTI: ICD-10-CM

## 2025-06-18 DIAGNOSIS — R19.7 DIARRHEA, UNSPECIFIED TYPE: ICD-10-CM

## 2025-06-18 DIAGNOSIS — K66.0 ABDOMINAL ADHESIONS: ICD-10-CM

## 2025-06-18 DIAGNOSIS — R63.4 WEIGHT LOSS: ICD-10-CM

## 2025-06-18 DIAGNOSIS — N28.1 RENAL CYST, LEFT: ICD-10-CM

## 2025-06-18 DIAGNOSIS — R14.0 ABDOMINAL BLOATING: Primary | ICD-10-CM

## 2025-06-18 DIAGNOSIS — K57.92 ACUTE DIVERTICULITIS: ICD-10-CM

## 2025-06-18 DIAGNOSIS — B37.31 YEAST VAGINITIS: ICD-10-CM

## 2025-06-18 PROCEDURE — 3008F BODY MASS INDEX DOCD: CPT | Performed by: INTERNAL MEDICINE

## 2025-06-18 PROCEDURE — 1036F TOBACCO NON-USER: CPT | Performed by: INTERNAL MEDICINE

## 2025-06-18 PROCEDURE — 99214 OFFICE O/P EST MOD 30 MIN: CPT | Performed by: INTERNAL MEDICINE

## 2025-06-18 RX ORDER — FLUCONAZOLE 150 MG/1
150 TABLET ORAL ONCE
Qty: 1 TABLET | Refills: 0 | Status: SHIPPED | OUTPATIENT
Start: 2025-06-18 | End: 2025-06-18

## 2025-06-18 RX ORDER — METRONIDAZOLE 500 MG/1
500 TABLET ORAL 2 TIMES DAILY
Qty: 14 TABLET | Refills: 0 | Status: SHIPPED | OUTPATIENT
Start: 2025-06-18 | End: 2025-06-25

## 2025-06-18 RX ORDER — AMOXICILLIN AND CLAVULANATE POTASSIUM 875; 125 MG/1; MG/1
875 TABLET, FILM COATED ORAL 2 TIMES DAILY
Qty: 14 TABLET | Refills: 0 | Status: SHIPPED | OUTPATIENT
Start: 2025-06-18 | End: 2025-06-25

## 2025-06-29 LAB
CALPROTECTIN STL-MCNT: 6 MCG/G
CRYPTOSP AG STL QL IA: NORMAL
G LAMBLIA AG STL QL IA: NORMAL
O+P STL TRI STN: NORMAL

## 2025-07-03 ENCOUNTER — APPOINTMENT (OUTPATIENT)
Dept: UROLOGY | Facility: CLINIC | Age: 60
End: 2025-07-03
Payer: COMMERCIAL

## 2025-07-03 VITALS
BODY MASS INDEX: 21.66 KG/M2 | TEMPERATURE: 98.1 F | WEIGHT: 138 LBS | HEART RATE: 52 BPM | HEIGHT: 67 IN | DIASTOLIC BLOOD PRESSURE: 71 MMHG | SYSTOLIC BLOOD PRESSURE: 128 MMHG

## 2025-07-03 DIAGNOSIS — R39.9 UTI SYMPTOMS: ICD-10-CM

## 2025-07-03 DIAGNOSIS — N95.8 GENITOURINARY SYNDROME OF MENOPAUSE: ICD-10-CM

## 2025-07-03 DIAGNOSIS — N39.0 RECURRENT UTI: Primary | ICD-10-CM

## 2025-07-03 DIAGNOSIS — R30.0 DYSURIA: ICD-10-CM

## 2025-07-03 LAB
POC APPEARANCE, URINE: ABNORMAL
POC BILIRUBIN, URINE: NEGATIVE
POC BLOOD, URINE: NEGATIVE
POC COLOR, URINE: YELLOW
POC GLUCOSE, URINE: NEGATIVE MG/DL
POC KETONES, URINE: NEGATIVE MG/DL
POC LEUKOCYTES, URINE: ABNORMAL
POC NITRITE,URINE: POSITIVE
POC PH, URINE: 7 PH
POC PROTEIN, URINE: NEGATIVE MG/DL
POC SPECIFIC GRAVITY, URINE: 1.02
POC UROBILINOGEN, URINE: 0.2 EU/DL

## 2025-07-03 PROCEDURE — 99213 OFFICE O/P EST LOW 20 MIN: CPT | Performed by: NURSE PRACTITIONER

## 2025-07-03 PROCEDURE — 81003 URINALYSIS AUTO W/O SCOPE: CPT | Performed by: NURSE PRACTITIONER

## 2025-07-03 PROCEDURE — 3008F BODY MASS INDEX DOCD: CPT | Performed by: NURSE PRACTITIONER

## 2025-07-03 PROCEDURE — 1036F TOBACCO NON-USER: CPT | Performed by: NURSE PRACTITIONER

## 2025-07-03 ASSESSMENT — PATIENT HEALTH QUESTIONNAIRE - PHQ9
SUM OF ALL RESPONSES TO PHQ9 QUESTIONS 1 AND 2: 0
1. LITTLE INTEREST OR PLEASURE IN DOING THINGS: NOT AT ALL
2. FEELING DOWN, DEPRESSED OR HOPELESS: NOT AT ALL

## 2025-07-03 NOTE — PROGRESS NOTES
07/03/25   [id number]    Chief Complaint   Patient presents with    UTI   Follow up Recurrent Urinary tract infections, lower urinary tract symptoms    Subjective      HPI Anabella Suarez is a 60 y.o. female who presents for follow up recurrent urinary tract infections, lower urinary tract symptoms. Last seen 5/15/25;     RIGOBERTO on 5/21/25: IMPRESSION: No hydro, no nephrolithiasis, simple left renal cyst. Otherwise unremarkable exam;     UA today + nit, trace leuk; 50% improvement since last visit, started estrogen vaginal cream, vulvar moisturizer, Dmannose daily; recently given augmentin and flagyl to treat GI symptoms, was waiting until visit today; discussed augmentin would help with urine symptoms if UTI brewing;     History reviewed.  PMH arthritis  PSH hysterectomy 2011, large left ovarian cystic mass 2013, hydronephrosis 2013 and 2014;  FH mother ovarian cancer  SH [facility], non smoker      Recap last notes 5/15/25: Kindly referred by Dr. Penaloza. History significant for hydronephrosis related to large left ovarian cystic mass (benign) removed 2013; later had to more procedures related to hydronephrosis with stent after mass was removed in 2013 and 2014. Saw Dr. Shetty in past, last in 2019, history of urethral polyp, at that time UTIs every few months then and still occurring; on exam Dr. Shetty noted two urethral caruncle on exam; when patient has sx of UTI, calls primary care, usually take bactrim to treat the UTIs; symptoms is dysuria, usually yeast from antibiotics per patient; not using estrogen cream regularly;     5/2/25 UA w micro wbc 0-5/hpf, rbc none seen, no bacteria, culture normal genital thai    10/29/24 UA negative, urine culture no growth  10/14/25 creatinine 0.65,   2/22/22 Ecoli + urine culture    PVR 0 ml, UA trace leuk    11/22/16 Renal ultrasound: stable aml 6 mm right,  no hydro, no stone either kidney;      4/7/14 RIGOBERTO A prominent right renal pelvis but without evidence of caliectasis.  "This represents improvement when compared to the CT of 1/3/2014.    1/3/2014 CT abd/pel w IV Right lateral pelvic complex cystic mass producing obstruction of the right ureter with moderate to severe right-sided hydronephrosis. Right-sided hydronephrosis has progressed or is new from previous renal ultrasound of 10/8/2013. Findings are concerning for cystic lymphadenopathy, ovarian cystic neoplasm or inflammatory cystic mass. Associated small amount of free fluid.  2013 mild right caliectasis    DTF 5 x, NTF 1 x some UUI, often CIERA  Not sexually active, ok at this time with partner, dyspareunia      Objective     /71   Pulse 52   Temp 36.7 °C (98.1 °F)   Ht 1.702 m (5' 7\")   Wt 62.6 kg (138 lb)   BMI 21.61 kg/m²    Physical Exam  Genitourinary:     Comments: No vulvar lesions, neg Qtip tenderness, mod atrophy  Neg CST lying down, Neg levator ani tenderness or tightness  Stage I-II cystocele, no rectocele, hysterectomy  Non tender ovaries/uterus (hysterectomy), difficult exam d/t  body habitus   No rectal exam done        General: Appears comfortable and in no apparent distress, well nourished  Head: Normocephalic, atraumatic  Neck: trachea midline  Respiratory: respirations unlabored, no wheezes, and no use of accessory muscles  Cardiovascular: at rest no dyspnea, well perfused  Skin: no visible rashes or lesions  Neurologic: grossly intact, oriented to person, place, and time  Psychiatric: mood and affect appropriate  Musculoskeletal: in chair for appt. no difficulty w upper body movement    Assessment/Plan   Problem List Items Addressed This Visit          Genitourinary and Reproductive    Recurrent UTI - Primary    Relevant Orders    POCT UA Automated manually resulted (Completed)    Urinalysis with Reflex Culture and Microscopic     Other Visit Diagnoses         Dysuria        Relevant Orders    Urinalysis with Reflex Culture and Microscopic      UTI symptoms        Relevant Orders    Urinalysis with " Reflex Culture and Microscopic      Genitourinary syndrome of menopause                Orders Placed This Encounter   Procedures    Urinalysis with Reflex Culture and Microscopic     Release result to MyChart:   Immediate [1]    POCT UA Automated manually resulted     Release result to MyChart:   Immediate [1]      Vaginal estrogen and dmanose  Vulvar moisturizer aquarphor, vaseline, coconut oil    Discussed prevention of urinary tract infections including  1. Voiding 8 x per day, urinating every 2-3 hours to flush out bacteria  2. Aggressive management of both constipation or diarrhea   3. Vitamin C 500 mg daily to help boost immune system  4. D-Mannose 2 gram daily either powder or pill or cranberry 500 mg or 4 oz juice three times per day as directed   5. Vaginal acidification as directed-estrogen vaginal cream 1 gram amount estrogen cream on finger, rub into tissue nightly x 2 weeks, then M, W, F at bedtime    Plan:  Urine sent today culture  Has augmentin and flagyl to start from another practitioner  Continue UTI prevention Dmanose, vaginal estrogen, vulvar moisturizer    Probiotic once daily at lunch time    Follow up 3-4 mos UTIs  Nurse line [phone]         Lisa Dos Santos, APRN-CNP  Lab Results   Component Value Date    GLUCOSE 91 01/25/2024    CALCIUM 9.2 01/25/2024     01/25/2024    K 4.0 01/25/2024    CO2 28 01/25/2024     01/25/2024    BUN 15 01/25/2024    CREATININE 0.64 01/25/2024

## 2025-07-03 NOTE — PATIENT INSTRUCTIONS
Plan:  Urine sent today culture  Has augmentin and flagyl to start from another practitioner  Continue UTI prevention Dmanose, vaginal estrogen, vulvar moisturizer    Probiotic once daily at lunch time    Follow up 3-4 mos  Nurse line [phone]

## 2025-07-04 DIAGNOSIS — R30.0 DYSURIA: ICD-10-CM

## 2025-07-04 DIAGNOSIS — R39.9 UTI SYMPTOMS: ICD-10-CM

## 2025-07-06 LAB
APPEARANCE UR: ABNORMAL
BACTERIA #/AREA URNS HPF: ABNORMAL /HPF
BACTERIA UR CULT: ABNORMAL
BACTERIA UR CULT: ABNORMAL
BILIRUB UR QL STRIP: NEGATIVE
COLOR UR: YELLOW
GLUCOSE UR QL STRIP: NEGATIVE
HGB UR QL STRIP: NEGATIVE
HYALINE CASTS #/AREA URNS LPF: ABNORMAL /LPF
KETONES UR QL STRIP: NEGATIVE
LEUKOCYTE ESTERASE UR QL STRIP: ABNORMAL
NITRITE UR QL STRIP: POSITIVE
PH UR STRIP: 7 [PH] (ref 5–8)
PROT UR QL STRIP: NEGATIVE
RBC #/AREA URNS HPF: ABNORMAL /HPF
SERVICE CMNT-IMP: ABNORMAL
SP GR UR STRIP: 1.01 (ref 1–1.03)
SQUAMOUS #/AREA URNS HPF: ABNORMAL /HPF
WBC #/AREA URNS HPF: ABNORMAL /HPF

## 2025-07-07 ENCOUNTER — TELEPHONE (OUTPATIENT)
Dept: UROLOGY | Facility: CLINIC | Age: 60
End: 2025-07-07
Payer: COMMERCIAL

## 2025-07-07 NOTE — TELEPHONE ENCOUNTER
Detailed message left on pt VM informing her of positive urine culture and to finish Augmentin as it is susceptible.

## 2025-07-15 ENCOUNTER — TELEMEDICINE (OUTPATIENT)
Dept: PRIMARY CARE | Facility: CLINIC | Age: 60
End: 2025-07-15
Payer: COMMERCIAL

## 2025-07-15 VITALS — WEIGHT: 138 LBS | BODY MASS INDEX: 21.61 KG/M2

## 2025-07-15 DIAGNOSIS — U07.1 COVID: Primary | ICD-10-CM

## 2025-07-15 DIAGNOSIS — D47.2 MONOCLONAL GAMMOPATHY OF UNDETERMINED SIGNIFICANCE: ICD-10-CM

## 2025-07-15 PROCEDURE — 1036F TOBACCO NON-USER: CPT | Performed by: NURSE PRACTITIONER

## 2025-07-15 PROCEDURE — 99214 OFFICE O/P EST MOD 30 MIN: CPT | Performed by: NURSE PRACTITIONER

## 2025-07-15 RX ORDER — NIRMATRELVIR AND RITONAVIR 300-100 MG
3 KIT ORAL 2 TIMES DAILY
Qty: 30 TABLET | Refills: 0 | Status: SHIPPED | OUTPATIENT
Start: 2025-07-15 | End: 2025-07-20

## 2025-07-15 RX ORDER — FLUTICASONE PROPIONATE 50 MCG
1 SPRAY, SUSPENSION (ML) NASAL DAILY
Qty: 16 G | Refills: 1 | Status: SHIPPED | OUTPATIENT
Start: 2025-07-15 | End: 2025-08-14

## 2025-07-15 RX ORDER — BENZONATATE 200 MG/1
200 CAPSULE ORAL 3 TIMES DAILY PRN
Qty: 42 CAPSULE | Refills: 0 | Status: SHIPPED | OUTPATIENT
Start: 2025-07-15 | End: 2025-07-29

## 2025-07-15 ASSESSMENT — ENCOUNTER SYMPTOMS
COUGH: 1
CONSTIPATION: 0
FATIGUE: 0
FEVER: 0
DIZZINESS: 0
SORE THROAT: 1
VOMITING: 0
HEADACHES: 1
MYALGIAS: 0
ARTHRALGIAS: 0
DIARRHEA: 0
PALPITATIONS: 0
CHILLS: 0
NUMBNESS: 0
NAUSEA: 0
SHORTNESS OF BREATH: 0

## 2025-07-15 NOTE — PROGRESS NOTES
Subjective   Patient ID: Anabella Suarez is a 60 y.o. female who presents for URI (Pt tested positive for covid 19 , runny nose , cough , fever ).    Virtual or Telephone Consent    An interactive audio and video telecommunication system which permits real time communications between the patient (at the originating site) and provider (at the distant site) was utilized to provide this telehealth service.   Verbal consent was requested and obtained from Anabella Suarez on this date, 07/15/25 for a telehealth visit and the patient's location was confirmed at the time of the visit.    Symptoms started Sunday night.   Last night developed LG fever.   Taking Advil with minimal relief.   Has been vaccinated for COVID but not up to date on current vaccine.   COVID (+) at home.     URI   Associated symptoms include congestion, coughing, headaches and a sore throat. Pertinent negatives include no chest pain, diarrhea, nausea, rash or vomiting.   Sore Throat   The current episode started yesterday. There has been no fever. The fever has been present for Less than 1 day. The pain is at a severity of 7/10. The pain is mild. Associated symptoms include congestion, coughing and headaches. Pertinent negatives include no diarrhea, shortness of breath or vomiting.        Review of Systems   Constitutional:  Negative for chills, fatigue and fever.   HENT:  Positive for congestion and sore throat.    Respiratory:  Positive for cough. Negative for shortness of breath.    Cardiovascular:  Negative for chest pain, palpitations and leg swelling.   Gastrointestinal:  Negative for constipation, diarrhea, nausea and vomiting.   Musculoskeletal:  Negative for arthralgias and myalgias.   Skin:  Negative for rash.   Neurological:  Positive for headaches. Negative for dizziness and numbness.   All other systems reviewed and are negative.      Objective   Wt 62.6 kg (138 lb)   BMI 21.61 kg/m²     Physical Exam  Constitutional:       General: She  is not in acute distress.     Appearance: Normal appearance. She is not ill-appearing, toxic-appearing or diaphoretic.   Pulmonary:      Effort: Pulmonary effort is normal.   Neurological:      Mental Status: She is alert.      Assessment/Plan   Assessment & Plan  COVID    Orders:    fluticasone (Flonase) 50 mcg/actuation nasal spray; Administer 1 spray into each nostril once daily. Shake gently. Before first use, prime pump. After use, clean tip and replace cap.    benzonatate (Tessalon) 200 mg capsule; Take 1 capsule (200 mg) by mouth 3 times a day as needed for cough for up to 14 days. Do not crush or chew.    nirmatrelvir-ritonavir (Paxlovid) 300 mg (150 mg x 2)-100 mg tablet therapy pack; Take 3 tablets by mouth 2 times a day for 5 days. Follow the instructions on the package  -Recent liver and kidney function normal.   - Monoclonal gammopathy Hx. Will start Paxlovid. Patient nervous to take medication due to possible side effects. Instructed patient she has to start medication as soon as possible after symptoms onset, but she could wait 1 day. May stop if experiences side effects.  -Discussed symptomatic management otherwise.   -Take tessalon at night and if needed during the day. Do not suppress cough entirely.   - Get lots of rest, and drink plenty of liquids.  -Breath in warm, moist air, such as in the shower, over a kettle, or from a humidifier.  -Call or return to office as needed if these symptoms worsen or fail to improve as anticipated.  Monoclonal gammopathy of undetermined significance  -WBC normal on recent lab work.   -Platelets are normal on most recent lab work.

## 2025-07-15 NOTE — LETTER
July 15, 2025     Patient: Anabella Suarez   YOB: 1965   Date of Visit: 7/15/2025       To Whom It May Concern:    Anabella Suarez was seen in my clinic on 7/15/2025 at 1:00 pm. Please excuse Anabella for her absence from work on this day through 7/19/25.     If you have any questions or concerns, please don't hesitate to call.         Sincerely,         Mellisa Mercedes, AWA-CNP        CC: No Recipients

## 2025-08-06 DIAGNOSIS — U07.1 COVID: ICD-10-CM

## 2025-08-06 RX ORDER — FLUTICASONE PROPIONATE 50 MCG
SPRAY, SUSPENSION (ML) NASAL
Qty: 48 ML | Refills: 3 | Status: SHIPPED | OUTPATIENT
Start: 2025-08-06

## 2025-10-21 ENCOUNTER — APPOINTMENT (OUTPATIENT)
Dept: UROLOGY | Facility: CLINIC | Age: 60
End: 2025-10-21
Payer: COMMERCIAL

## 2026-02-02 ENCOUNTER — APPOINTMENT (OUTPATIENT)
Dept: PRIMARY CARE | Facility: CLINIC | Age: 61
End: 2026-02-02
Payer: COMMERCIAL